# Patient Record
Sex: MALE | Race: WHITE | NOT HISPANIC OR LATINO | Employment: OTHER | URBAN - METROPOLITAN AREA
[De-identification: names, ages, dates, MRNs, and addresses within clinical notes are randomized per-mention and may not be internally consistent; named-entity substitution may affect disease eponyms.]

---

## 2023-02-14 PROBLEM — I35.1 AORTIC EJECTION MURMUR: Status: ACTIVE | Noted: 2023-02-14

## 2023-02-14 PROBLEM — I35.8 AORTIC VALVE SCLEROSIS: Status: ACTIVE | Noted: 2023-02-14

## 2023-02-14 PROBLEM — E66.9 CLASS 1 OBESITY WITH BODY MASS INDEX (BMI) OF 32.0 TO 32.9 IN ADULT: Status: ACTIVE | Noted: 2023-02-14

## 2023-02-14 PROBLEM — R09.89 FEMORAL BRUIT: Status: ACTIVE | Noted: 2023-02-14

## 2023-02-14 PROBLEM — K02.9 COMPLEX DENTAL CARIES: Status: ACTIVE | Noted: 2023-02-14

## 2023-02-14 PROBLEM — E66.811 CLASS 1 OBESITY WITH BODY MASS INDEX (BMI) OF 32.0 TO 32.9 IN ADULT: Status: ACTIVE | Noted: 2023-02-14

## 2023-02-14 PROBLEM — R00.1 SINUS BRADYCARDIA: Status: ACTIVE | Noted: 2023-02-14

## 2023-02-14 PROBLEM — N40.1 BPH ASSOCIATED WITH NOCTURIA: Status: ACTIVE | Noted: 2023-02-14

## 2023-02-14 PROBLEM — R09.89 ABDOMINAL BRUIT: Status: ACTIVE | Noted: 2023-02-14

## 2023-02-14 PROBLEM — R35.1 BPH ASSOCIATED WITH NOCTURIA: Status: ACTIVE | Noted: 2023-02-14

## 2023-02-14 PROBLEM — I10 HYPERTENSION: Status: ACTIVE | Noted: 2023-02-14

## 2023-02-14 PROBLEM — K43.9 VENTRAL HERNIA WITHOUT OBSTRUCTION OR GANGRENE: Status: ACTIVE | Noted: 2023-02-14

## 2023-02-14 PROBLEM — I10 ESSENTIAL HYPERTENSION: Status: ACTIVE | Noted: 2023-02-14

## 2023-02-14 PROBLEM — E78.5 HYPERLIPIDEMIA: Status: ACTIVE | Noted: 2023-02-14

## 2023-02-14 PROBLEM — E11.9 DIABETES MELLITUS (MULTI): Status: ACTIVE | Noted: 2023-02-14

## 2023-02-14 PROBLEM — C67.9: Status: ACTIVE | Noted: 2023-02-14

## 2023-02-14 PROBLEM — N18.30 CHRONIC KIDNEY DISEASE (CKD), STAGE III (MODERATE) (MULTI): Status: ACTIVE | Noted: 2023-02-14

## 2023-02-14 PROBLEM — I11.9 HYPERTENSIVE HEART DISEASE: Status: ACTIVE | Noted: 2023-02-14

## 2023-02-14 PROBLEM — E66.9 OBESITY: Status: ACTIVE | Noted: 2023-02-14

## 2023-02-14 RX ORDER — METFORMIN HYDROCHLORIDE 1000 MG/1
1 TABLET ORAL EVERY 12 HOURS
COMMUNITY
Start: 2021-05-26 | End: 2023-05-25

## 2023-02-14 RX ORDER — EZETIMIBE AND SIMVASTATIN 10; 40 MG/1; MG/1
1 TABLET ORAL EVERY EVENING
COMMUNITY
End: 2023-11-24 | Stop reason: SDUPTHER

## 2023-02-14 RX ORDER — AMLODIPINE BESYLATE 10 MG/1
1 TABLET ORAL DAILY
COMMUNITY
Start: 2020-08-14 | End: 2023-08-29 | Stop reason: SDUPTHER

## 2023-02-14 RX ORDER — SPIRONOLACTONE 25 MG/1
1 TABLET ORAL 2 TIMES DAILY
COMMUNITY
Start: 2022-04-11 | End: 2023-04-10

## 2023-02-14 RX ORDER — BLOOD SUGAR DIAGNOSTIC
STRIP MISCELLANEOUS
COMMUNITY
Start: 2019-12-02

## 2023-02-14 RX ORDER — LIRAGLUTIDE 6 MG/ML
1.2 INJECTION SUBCUTANEOUS DAILY
COMMUNITY
End: 2023-07-12

## 2023-02-14 RX ORDER — LOSARTAN POTASSIUM AND HYDROCHLOROTHIAZIDE 12.5; 1 MG/1; MG/1
1 TABLET ORAL DAILY
COMMUNITY
Start: 2020-12-21 | End: 2023-05-26

## 2023-02-14 RX ORDER — PIOGLITAZONEHYDROCHLORIDE 30 MG/1
1 TABLET ORAL DAILY
COMMUNITY
End: 2023-09-18

## 2023-02-14 RX ORDER — FERROUS SULFATE 325(65) MG
1 TABLET ORAL DAILY
COMMUNITY

## 2023-02-14 RX ORDER — PEN NEEDLE, DIABETIC 30 GX3/16"
NEEDLE, DISPOSABLE MISCELLANEOUS
COMMUNITY
End: 2023-07-17

## 2023-02-14 RX ORDER — CARVEDILOL 6.25 MG/1
1 TABLET ORAL
COMMUNITY
Start: 2014-07-31

## 2023-02-14 RX ORDER — CHOLECALCIFEROL (VITAMIN D3) 50 MCG
1 TABLET ORAL DAILY
COMMUNITY

## 2023-03-09 ENCOUNTER — TELEPHONE (OUTPATIENT)
Dept: PRIMARY CARE | Facility: CLINIC | Age: 78
End: 2023-03-09
Payer: COMMERCIAL

## 2023-03-09 DIAGNOSIS — N40.1 BPH ASSOCIATED WITH NOCTURIA: ICD-10-CM

## 2023-03-09 DIAGNOSIS — R35.1 BPH ASSOCIATED WITH NOCTURIA: ICD-10-CM

## 2023-03-09 DIAGNOSIS — I11.9 HYPERTENSIVE HEART DISEASE, UNSPECIFIED WHETHER HEART FAILURE PRESENT: Primary | ICD-10-CM

## 2023-03-22 ENCOUNTER — TELEPHONE (OUTPATIENT)
Dept: PRIMARY CARE | Facility: CLINIC | Age: 78
End: 2023-03-22
Payer: COMMERCIAL

## 2023-03-22 NOTE — TELEPHONE ENCOUNTER
----- Message from Itz Talbert DO sent at 3/21/2023  4:35 PM EDT -----  Please call patient back with lab results.  Overall they do look good, there is a slight increase in blood sugar, we will check an in office hemoglobin A1c at next appointment, otherwise no significant changes.  We will discuss more at next appointment.

## 2023-03-27 ENCOUNTER — OFFICE VISIT (OUTPATIENT)
Dept: PRIMARY CARE | Facility: CLINIC | Age: 78
End: 2023-03-27
Payer: COMMERCIAL

## 2023-03-27 VITALS
WEIGHT: 207.5 LBS | SYSTOLIC BLOOD PRESSURE: 137 MMHG | HEART RATE: 58 BPM | DIASTOLIC BLOOD PRESSURE: 60 MMHG | BODY MASS INDEX: 35.62 KG/M2 | OXYGEN SATURATION: 97 % | TEMPERATURE: 98.3 F | RESPIRATION RATE: 18 BRPM

## 2023-03-27 DIAGNOSIS — E11.9 TYPE 2 DIABETES MELLITUS WITHOUT COMPLICATION, WITHOUT LONG-TERM CURRENT USE OF INSULIN (MULTI): Primary | ICD-10-CM

## 2023-03-27 DIAGNOSIS — N40.1 BPH ASSOCIATED WITH NOCTURIA: ICD-10-CM

## 2023-03-27 DIAGNOSIS — N18.31 STAGE 3A CHRONIC KIDNEY DISEASE (MULTI): ICD-10-CM

## 2023-03-27 DIAGNOSIS — R73.9 HYPERGLYCEMIA: ICD-10-CM

## 2023-03-27 DIAGNOSIS — R35.1 BPH ASSOCIATED WITH NOCTURIA: ICD-10-CM

## 2023-03-27 LAB — POC HEMOGLOBIN A1C: 6.2 % (ref 4.2–6.5)

## 2023-03-27 PROCEDURE — 1159F MED LIST DOCD IN RCRD: CPT | Performed by: STUDENT IN AN ORGANIZED HEALTH CARE EDUCATION/TRAINING PROGRAM

## 2023-03-27 PROCEDURE — 99214 OFFICE O/P EST MOD 30 MIN: CPT | Performed by: STUDENT IN AN ORGANIZED HEALTH CARE EDUCATION/TRAINING PROGRAM

## 2023-03-27 PROCEDURE — 3078F DIAST BP <80 MM HG: CPT | Performed by: STUDENT IN AN ORGANIZED HEALTH CARE EDUCATION/TRAINING PROGRAM

## 2023-03-27 PROCEDURE — 3075F SYST BP GE 130 - 139MM HG: CPT | Performed by: STUDENT IN AN ORGANIZED HEALTH CARE EDUCATION/TRAINING PROGRAM

## 2023-03-27 PROCEDURE — 83036 HEMOGLOBIN GLYCOSYLATED A1C: CPT | Performed by: STUDENT IN AN ORGANIZED HEALTH CARE EDUCATION/TRAINING PROGRAM

## 2023-03-27 PROCEDURE — 1036F TOBACCO NON-USER: CPT | Performed by: STUDENT IN AN ORGANIZED HEALTH CARE EDUCATION/TRAINING PROGRAM

## 2023-03-27 PROCEDURE — 1160F RVW MEDS BY RX/DR IN RCRD: CPT | Performed by: STUDENT IN AN ORGANIZED HEALTH CARE EDUCATION/TRAINING PROGRAM

## 2023-03-27 ASSESSMENT — ENCOUNTER SYMPTOMS
LOSS OF SENSATION IN FEET: 0
DEPRESSION: 0
OCCASIONAL FEELINGS OF UNSTEADINESS: 0

## 2023-03-27 NOTE — PROGRESS NOTES
Subjective   Mario Alberto Segal is a 77 y.o. male who presents for Follow-up (Pt here today to for 6 month F/U and to discuss lab work).  Follows with urology   Repeat cystoscopy planned  Bladder lavage - has difficulties with catheter following post-procedural   Catheter 3-4 days after the procedure each time    BP checks at home 150s repeat today 137/50      Diabetic Review of Systems - medication compliance: compliant all of the time, diabetic diet compliance: noncompliant much of the time, home glucose monitoring: is performed regularly, acute symptoms are none .    none      Review of Systems   Constitutional:  Negative for fever.   Respiratory:  Negative for shortness of breath.    Cardiovascular:  Negative for chest pain.   Gastrointestinal:  Negative for nausea and vomiting.   Neurological:  Negative for dizziness and light-headedness.   All other systems reviewed and are negative.      Objective   Physical Exam  Vitals reviewed.   Constitutional:       General: He is not in acute distress.     Appearance: Normal appearance. He is not toxic-appearing.   HENT:      Head: Normocephalic and atraumatic.      Nose: Nose normal.   Eyes:      Extraocular Movements: Extraocular movements intact.   Cardiovascular:      Rate and Rhythm: Normal rate and regular rhythm.      Heart sounds: No murmur heard.     No friction rub. No gallop.   Pulmonary:      Effort: Pulmonary effort is normal. No respiratory distress.      Breath sounds: Normal breath sounds. No wheezing, rhonchi or rales.   Skin:     General: Skin is warm and dry.   Neurological:      General: No focal deficit present.      Mental Status: He is alert.   Psychiatric:         Mood and Affect: Mood normal.         Behavior: Behavior normal.         Assessment/Plan   Problem List Items Addressed This Visit          Genitourinary    Chronic kidney disease (CKD), stage III (moderate) (CMS/HCC)       Endocrine/Metabolic    Diabetes mellitus (CMS/HCC) - Primary     Relevant Orders    POCT glycosylated hemoglobin (Hb A1C) manually resulted (Completed)       Other    BPH associated with nocturia     Other Visit Diagnoses       Hyperglycemia        Relevant Orders    POCT glycosylated hemoglobin (Hb A1C) manually resulted (Completed)          Lab Results   Component Value Date    HGBA1C 6.2 03/27/2023   A1c 7.2 today.  Elevated for patient, but still in good control.  Continue treatment as previously prescribed  Complications include: Neuropathy  Continue to monitor blood sugars  Continue with follow ups including:   Ophthalmology: Up-to-date  Podiatry: Not Indicated  Cardiology: Not Indicated  Discussed healthy, low carb diet and trying to get 150 minutes of physical activity per week.  Follow up in 3 months or sooner if new concerns arise

## 2023-03-28 ASSESSMENT — ENCOUNTER SYMPTOMS
DIZZINESS: 0
SHORTNESS OF BREATH: 0
FEVER: 0
NAUSEA: 0
VOMITING: 0
LIGHT-HEADEDNESS: 0

## 2023-04-10 DIAGNOSIS — I10 ESSENTIAL (PRIMARY) HYPERTENSION: ICD-10-CM

## 2023-04-10 RX ORDER — SPIRONOLACTONE 25 MG/1
TABLET ORAL
Qty: 180 TABLET | Refills: 1 | Status: SHIPPED | OUTPATIENT
Start: 2023-04-10 | End: 2023-10-02 | Stop reason: ALTCHOICE

## 2023-05-25 DIAGNOSIS — E11.9 TYPE 2 DIABETES MELLITUS WITHOUT COMPLICATIONS (MULTI): ICD-10-CM

## 2023-05-25 RX ORDER — METFORMIN HYDROCHLORIDE 1000 MG/1
TABLET ORAL
Qty: 180 TABLET | Refills: 3 | Status: SHIPPED | OUTPATIENT
Start: 2023-05-25 | End: 2024-05-13

## 2023-05-26 DIAGNOSIS — I10 ESSENTIAL (PRIMARY) HYPERTENSION: ICD-10-CM

## 2023-05-26 RX ORDER — LOSARTAN POTASSIUM AND HYDROCHLOROTHIAZIDE 12.5; 1 MG/1; MG/1
TABLET ORAL
Qty: 90 TABLET | Refills: 2 | Status: SHIPPED | OUTPATIENT
Start: 2023-05-26 | End: 2024-03-07 | Stop reason: ALTCHOICE

## 2023-07-12 DIAGNOSIS — E11.9 TYPE 2 DIABETES MELLITUS WITHOUT COMPLICATIONS (MULTI): ICD-10-CM

## 2023-07-12 RX ORDER — LIRAGLUTIDE 6 MG/ML
INJECTION SUBCUTANEOUS
Qty: 18 EACH | Refills: 3 | Status: SHIPPED | OUTPATIENT
Start: 2023-07-12

## 2023-07-17 DIAGNOSIS — E11.9 TYPE 2 DIABETES MELLITUS WITHOUT COMPLICATIONS (MULTI): ICD-10-CM

## 2023-07-17 RX ORDER — PEN NEEDLE, DIABETIC 32GX 5/32"
NEEDLE, DISPOSABLE MISCELLANEOUS
Qty: 200 EACH | Refills: 3 | Status: SHIPPED | OUTPATIENT
Start: 2023-07-17

## 2023-07-27 ENCOUNTER — TELEPHONE (OUTPATIENT)
Dept: PRIMARY CARE | Facility: CLINIC | Age: 78
End: 2023-07-27
Payer: COMMERCIAL

## 2023-07-27 NOTE — TELEPHONE ENCOUNTER
Pt called to let you know he saw the specialist yesterday and his potassium was too high. They looked at his med list and are having him stop the Spironolactone. He wanted to let you know since he's taking it for BP and wanting to know if he should start something else now in place of that?

## 2023-08-09 ENCOUNTER — TELEPHONE (OUTPATIENT)
Dept: PRIMARY CARE | Facility: CLINIC | Age: 78
End: 2023-08-09
Payer: COMMERCIAL

## 2023-08-09 DIAGNOSIS — I10 ESSENTIAL HYPERTENSION: Primary | ICD-10-CM

## 2023-08-10 RX ORDER — HYDRALAZINE HYDROCHLORIDE 10 MG/1
10 TABLET, FILM COATED ORAL 2 TIMES DAILY
Qty: 60 TABLET | Refills: 1 | Status: SHIPPED | OUTPATIENT
Start: 2023-08-10 | End: 2023-09-06

## 2023-08-28 ENCOUNTER — TELEPHONE (OUTPATIENT)
Dept: PRIMARY CARE | Facility: CLINIC | Age: 78
End: 2023-08-28
Payer: COMMERCIAL

## 2023-08-28 DIAGNOSIS — I10 ESSENTIAL HYPERTENSION: ICD-10-CM

## 2023-08-29 DIAGNOSIS — I10 ESSENTIAL HYPERTENSION: Primary | ICD-10-CM

## 2023-08-29 RX ORDER — AMLODIPINE BESYLATE 10 MG/1
10 TABLET ORAL DAILY
Qty: 90 TABLET | Refills: 1 | Status: SHIPPED | OUTPATIENT
Start: 2023-08-29 | End: 2024-04-05

## 2023-09-02 DIAGNOSIS — I10 ESSENTIAL HYPERTENSION: ICD-10-CM

## 2023-09-06 RX ORDER — HYDRALAZINE HYDROCHLORIDE 10 MG/1
10 TABLET, FILM COATED ORAL 2 TIMES DAILY
Qty: 60 TABLET | Refills: 1 | Status: SHIPPED | OUTPATIENT
Start: 2023-09-06 | End: 2023-10-02 | Stop reason: SDUPTHER

## 2023-09-07 PROBLEM — D64.9 ANEMIA: Status: ACTIVE | Noted: 2023-09-07

## 2023-09-07 PROBLEM — E88.810 METABOLIC SYNDROME: Status: ACTIVE | Noted: 2023-09-07

## 2023-09-07 PROBLEM — E55.9 VITAMIN D DEFICIENCY: Status: ACTIVE | Noted: 2023-09-07

## 2023-09-07 RX ORDER — NYSTATIN 100000 [USP'U]/G
POWDER TOPICAL
COMMUNITY
Start: 2023-06-06

## 2023-09-07 RX ORDER — VERAPAMIL HYDROCHLORIDE 240 MG/1
0.5 TABLET, FILM COATED, EXTENDED RELEASE ORAL DAILY
COMMUNITY
End: 2024-04-15 | Stop reason: ALTCHOICE

## 2023-09-07 RX ORDER — CARVEDILOL 3.12 MG/1
3.12 TABLET ORAL 2 TIMES DAILY
COMMUNITY

## 2023-09-13 ENCOUNTER — TELEPHONE (OUTPATIENT)
Dept: PRIMARY CARE | Facility: CLINIC | Age: 78
End: 2023-09-13
Payer: COMMERCIAL

## 2023-09-13 DIAGNOSIS — Z12.5 PROSTATE CANCER SCREENING: Primary | ICD-10-CM

## 2023-09-13 DIAGNOSIS — E11.9 TYPE 2 DIABETES MELLITUS WITHOUT COMPLICATION, WITHOUT LONG-TERM CURRENT USE OF INSULIN (MULTI): ICD-10-CM

## 2023-09-13 DIAGNOSIS — Z11.59 ENCOUNTER FOR HEPATITIS C SCREENING TEST FOR LOW RISK PATIENT: ICD-10-CM

## 2023-09-13 DIAGNOSIS — Z11.4 ENCOUNTER FOR SCREENING FOR HIV: ICD-10-CM

## 2023-09-13 DIAGNOSIS — N18.31 STAGE 3A CHRONIC KIDNEY DISEASE (MULTI): ICD-10-CM

## 2023-09-18 DIAGNOSIS — E11.9 TYPE 2 DIABETES MELLITUS WITHOUT COMPLICATIONS (MULTI): ICD-10-CM

## 2023-09-18 RX ORDER — PIOGLITAZONEHYDROCHLORIDE 30 MG/1
30 TABLET ORAL DAILY
Qty: 90 TABLET | Refills: 2 | Status: SHIPPED | OUTPATIENT
Start: 2023-09-18

## 2023-10-02 ENCOUNTER — OFFICE VISIT (OUTPATIENT)
Dept: PRIMARY CARE | Facility: CLINIC | Age: 78
End: 2023-10-02
Payer: MEDICARE

## 2023-10-02 VITALS
DIASTOLIC BLOOD PRESSURE: 67 MMHG | HEIGHT: 64 IN | RESPIRATION RATE: 16 BRPM | OXYGEN SATURATION: 96 % | BODY MASS INDEX: 37.43 KG/M2 | TEMPERATURE: 99 F | SYSTOLIC BLOOD PRESSURE: 139 MMHG | HEART RATE: 68 BPM | WEIGHT: 219.25 LBS

## 2023-10-02 DIAGNOSIS — N18.31 STAGE 3A CHRONIC KIDNEY DISEASE (MULTI): ICD-10-CM

## 2023-10-02 DIAGNOSIS — E66.01 OBESITY, MORBID (MULTI): ICD-10-CM

## 2023-10-02 DIAGNOSIS — C67.9 CANCER OF BLADDER WALL (MULTI): ICD-10-CM

## 2023-10-02 DIAGNOSIS — E11.22 TYPE 2 DIABETES MELLITUS WITH STAGE 3A CHRONIC KIDNEY DISEASE, WITHOUT LONG-TERM CURRENT USE OF INSULIN (MULTI): ICD-10-CM

## 2023-10-02 DIAGNOSIS — N18.31 TYPE 2 DIABETES MELLITUS WITH STAGE 3A CHRONIC KIDNEY DISEASE, WITHOUT LONG-TERM CURRENT USE OF INSULIN (MULTI): ICD-10-CM

## 2023-10-02 DIAGNOSIS — Z00.00 ROUTINE GENERAL MEDICAL EXAMINATION AT HEALTH CARE FACILITY: Primary | ICD-10-CM

## 2023-10-02 DIAGNOSIS — I10 ESSENTIAL HYPERTENSION: ICD-10-CM

## 2023-10-02 PROCEDURE — 1170F FXNL STATUS ASSESSED: CPT | Performed by: STUDENT IN AN ORGANIZED HEALTH CARE EDUCATION/TRAINING PROGRAM

## 2023-10-02 PROCEDURE — 99214 OFFICE O/P EST MOD 30 MIN: CPT | Performed by: STUDENT IN AN ORGANIZED HEALTH CARE EDUCATION/TRAINING PROGRAM

## 2023-10-02 PROCEDURE — 1126F AMNT PAIN NOTED NONE PRSNT: CPT | Performed by: STUDENT IN AN ORGANIZED HEALTH CARE EDUCATION/TRAINING PROGRAM

## 2023-10-02 PROCEDURE — 1159F MED LIST DOCD IN RCRD: CPT | Performed by: STUDENT IN AN ORGANIZED HEALTH CARE EDUCATION/TRAINING PROGRAM

## 2023-10-02 PROCEDURE — 3078F DIAST BP <80 MM HG: CPT | Performed by: STUDENT IN AN ORGANIZED HEALTH CARE EDUCATION/TRAINING PROGRAM

## 2023-10-02 PROCEDURE — G0439 PPPS, SUBSEQ VISIT: HCPCS | Performed by: STUDENT IN AN ORGANIZED HEALTH CARE EDUCATION/TRAINING PROGRAM

## 2023-10-02 PROCEDURE — 3075F SYST BP GE 130 - 139MM HG: CPT | Performed by: STUDENT IN AN ORGANIZED HEALTH CARE EDUCATION/TRAINING PROGRAM

## 2023-10-02 PROCEDURE — 1160F RVW MEDS BY RX/DR IN RCRD: CPT | Performed by: STUDENT IN AN ORGANIZED HEALTH CARE EDUCATION/TRAINING PROGRAM

## 2023-10-02 PROCEDURE — 1036F TOBACCO NON-USER: CPT | Performed by: STUDENT IN AN ORGANIZED HEALTH CARE EDUCATION/TRAINING PROGRAM

## 2023-10-02 RX ORDER — HYDRALAZINE HYDROCHLORIDE 25 MG/1
25 TABLET, FILM COATED ORAL 2 TIMES DAILY
Qty: 90 TABLET | Refills: 0 | Status: SHIPPED | OUTPATIENT
Start: 2023-10-02 | End: 2023-11-10

## 2023-10-02 SDOH — ECONOMIC STABILITY: TRANSPORTATION INSECURITY
IN THE PAST 12 MONTHS, HAS THE LACK OF TRANSPORTATION KEPT YOU FROM MEDICAL APPOINTMENTS OR FROM GETTING MEDICATIONS?: NO

## 2023-10-02 SDOH — ECONOMIC STABILITY: FOOD INSECURITY: WITHIN THE PAST 12 MONTHS, YOU WORRIED THAT YOUR FOOD WOULD RUN OUT BEFORE YOU GOT MONEY TO BUY MORE.: NEVER TRUE

## 2023-10-02 SDOH — ECONOMIC STABILITY: FOOD INSECURITY: WITHIN THE PAST 12 MONTHS, THE FOOD YOU BOUGHT JUST DIDN'T LAST AND YOU DIDN'T HAVE MONEY TO GET MORE.: NEVER TRUE

## 2023-10-02 SDOH — ECONOMIC STABILITY: INCOME INSECURITY: IN THE LAST 12 MONTHS, WAS THERE A TIME WHEN YOU WERE NOT ABLE TO PAY THE MORTGAGE OR RENT ON TIME?: NO

## 2023-10-02 SDOH — ECONOMIC STABILITY: GENERAL
WHICH OF THE FOLLOWING WOULD YOU LIKE TO GET CONNECTED TO IN ORDER TO RECEIVE A DISCOUNT OR FOR FREE? (CHOOSE ALL THAT APPLY): NONE OF THESE

## 2023-10-02 SDOH — ECONOMIC STABILITY: HOUSING INSECURITY
IN THE LAST 12 MONTHS, WAS THERE A TIME WHEN YOU DID NOT HAVE A STEADY PLACE TO SLEEP OR SLEPT IN A SHELTER (INCLUDING NOW)?: NO

## 2023-10-02 SDOH — ECONOMIC STABILITY: GENERAL
WHICH OF THE FOLLOWING DO YOU KNOW HOW TO USE AND HAVE ACCESS TO EVERY DAY? (CHOOSE ALL THAT APPLY): SMARTPHONE WITH CELLULAR DATA PLAN;NONE OF THESE

## 2023-10-02 SDOH — ECONOMIC STABILITY: TRANSPORTATION INSECURITY
IN THE PAST 12 MONTHS, HAS LACK OF TRANSPORTATION KEPT YOU FROM MEETINGS, WORK, OR FROM GETTING THINGS NEEDED FOR DAILY LIVING?: NO

## 2023-10-02 SDOH — HEALTH STABILITY: PHYSICAL HEALTH: ON AVERAGE, HOW MANY MINUTES DO YOU ENGAGE IN EXERCISE AT THIS LEVEL?: 30 MIN

## 2023-10-02 SDOH — HEALTH STABILITY: PHYSICAL HEALTH: ON AVERAGE, HOW MANY DAYS PER WEEK DO YOU ENGAGE IN MODERATE TO STRENUOUS EXERCISE (LIKE A BRISK WALK)?: 7 DAYS

## 2023-10-02 ASSESSMENT — SOCIAL DETERMINANTS OF HEALTH (SDOH)
HOW HARD IS IT FOR YOU TO PAY FOR THE VERY BASICS LIKE FOOD, HOUSING, MEDICAL CARE, AND HEATING?: NOT HARD AT ALL
HOW OFTEN DO YOU GET TOGETHER WITH FRIENDS OR RELATIVES?: NEVER
HOW OFTEN DO YOU ATTEND CHURCH OR RELIGIOUS SERVICES?: NEVER
WITHIN THE LAST YEAR, HAVE YOU BEEN KICKED, HIT, SLAPPED, OR OTHERWISE PHYSICALLY HURT BY YOUR PARTNER OR EX-PARTNER?: NO
IN THE PAST 12 MONTHS, HAS THE ELECTRIC, GAS, OIL, OR WATER COMPANY THREATENED TO SHUT OFF SERVICE IN YOUR HOME?: NO
WITHIN THE LAST YEAR, HAVE YOU BEEN AFRAID OF YOUR PARTNER OR EX-PARTNER?: NO
DO YOU BELONG TO ANY CLUBS OR ORGANIZATIONS SUCH AS CHURCH GROUPS UNIONS, FRATERNAL OR ATHLETIC GROUPS, OR SCHOOL GROUPS?: NO
WITHIN THE LAST YEAR, HAVE YOU BEEN HUMILIATED OR EMOTIONALLY ABUSED IN OTHER WAYS BY YOUR PARTNER OR EX-PARTNER?: NO
IN A TYPICAL WEEK, HOW MANY TIMES DO YOU TALK ON THE PHONE WITH FAMILY, FRIENDS, OR NEIGHBORS?: TWICE A WEEK
HOW OFTEN DO YOU ATTENT MEETINGS OF THE CLUB OR ORGANIZATION YOU BELONG TO?: NEVER
WITHIN THE LAST YEAR, HAVE TO BEEN RAPED OR FORCED TO HAVE ANY KIND OF SEXUAL ACTIVITY BY YOUR PARTNER OR EX-PARTNER?: NO

## 2023-10-02 ASSESSMENT — ENCOUNTER SYMPTOMS
APPETITE CHANGE: 0
NAUSEA: 0
LIGHT-HEADEDNESS: 0
FATIGUE: 0
FLANK PAIN: 0
SHORTNESS OF BREATH: 0
ABDOMINAL PAIN: 0
LOSS OF SENSATION IN FEET: 0
DEPRESSION: 0
HEMATURIA: 0
BLOOD IN STOOL: 0
ARTHRALGIAS: 0
CONSTIPATION: 0
MYALGIAS: 0
OCCASIONAL FEELINGS OF UNSTEADINESS: 0
PALPITATIONS: 0
FEVER: 0
SINUS PAIN: 0
VOMITING: 0
RHINORRHEA: 0
DIARRHEA: 0
DIZZINESS: 0

## 2023-10-02 ASSESSMENT — LIFESTYLE VARIABLES
HOW MANY STANDARD DRINKS CONTAINING ALCOHOL DO YOU HAVE ON A TYPICAL DAY: PATIENT DOES NOT DRINK
HOW OFTEN DO YOU HAVE A DRINK CONTAINING ALCOHOL: NEVER

## 2023-10-02 ASSESSMENT — ACTIVITIES OF DAILY LIVING (ADL)
GROCERY_SHOPPING: INDEPENDENT
DOING_HOUSEWORK: INDEPENDENT
BATHING: INDEPENDENT
TAKING_MEDICATION: INDEPENDENT
DRESSING: INDEPENDENT
MANAGING_FINANCES: INDEPENDENT

## 2023-10-02 ASSESSMENT — PATIENT HEALTH QUESTIONNAIRE - PHQ9
SUM OF ALL RESPONSES TO PHQ9 QUESTIONS 1 AND 2: 0
2. FEELING DOWN, DEPRESSED OR HOPELESS: NOT AT ALL
1. LITTLE INTEREST OR PLEASURE IN DOING THINGS: NOT AT ALL

## 2023-10-02 NOTE — PROGRESS NOTES
Subjective   Mario Alberto Segal is a 77 y.o. male who is here for a routine exam.  Active Problem List      Comprehensive Medical/Surgical/Social/Family History  Past Medical History:   Diagnosis Date    Impacted cerumen, right ear 09/05/2019    Impacted cerumen of right ear    Other specified disorders of kidney and ureter 02/11/2022    Renal mass    Personal history of nicotine dependence 09/20/2021    History of nicotine dependence    Personal history of other diseases of urinary system 02/11/2022    History of hematuria    Personal history of urinary (tract) infections 12/20/2021    History of hemorrhagic cystitis    Unspecified symptoms and signs involving the genitourinary system     UTI symptoms     Past Surgical History:   Procedure Laterality Date    OTHER SURGICAL HISTORY  04/07/2022    Transurethral resection of bladder tumor     Social History     Social History Narrative    Not on file       Allergies and Medications  Patient has no known allergies.  Current Outpatient Medications on File Prior to Visit   Medication Sig Dispense Refill    amLODIPine (Norvasc) 10 mg tablet Take 1 tablet (10 mg) by mouth once daily. 90 tablet 1    ASPIRIN ORAL Take 1 tablet by mouth in the morning. Aspirin 81 MG TABS      carvedilol (Coreg) 6.25 mg tablet Take 1 tablet (6.25 mg) by mouth 2 times a day with meals.      cholecalciferol (Vitamin D-3) 50 MCG (2000 UT) tablet Take 1 tablet (50 mcg) by mouth once daily.      ezetimibe-simvastatin (Vytorin) 10-40 mg tablet Take 1 tablet by mouth once daily in the evening.      ferrous sulfate 325 (65 Fe) MG tablet Take 1 tablet (325 mg) by mouth once daily.      folic acid/multivit-min/lutein (CENTRUM SILVER ORAL)       liraglutide (Victoza 3-Dirk) 0.6 mg/0.1 mL (18 mg/3 mL) injection INJECT 1.2 MG UNDER THE SKIN ONCE DAILY 18 each 3    losartan-hydrochlorothiazide (Hyzaar) 100-12.5 mg tablet TAKE 1 TABLET BY MOUTH EVERY DAY 90 tablet 2    metFORMIN (Glucophage) 1,000 mg tablet  "TAKE 1 TABLET BY MOUTH EVERY 12 HOURS 180 tablet 3    nystatin (Mycostatin) 100,000 unit/gram powder Apply topically.  APPLY 2-3 TIMES DAILY TO AFFECTED AREA(S).      OneTouch Ultra Test strip 1 strip daily      pen needle, diabetic (BD Geraldine 2nd Gen Pen Needle) 32 gauge x 5/32\" needle USE 1 DAILY 200 each 3    pioglitazone (Actos) 30 mg tablet TAKE 1 TABLET BY MOUTH EVERY DAY 90 tablet 2    [DISCONTINUED] hydrALAZINE (Apresoline) 10 mg tablet TAKE 1 TABLET BY MOUTH TWICE A DAY 60 tablet 1    carvedilol (Coreg) 3.125 mg tablet Take 1 tablet (3.125 mg) by mouth 2 times a day.      verapamil SR (Calan-SR) 240 mg ER tablet Take 0.5 tablets (120 mg) by mouth once daily.      [DISCONTINUED] spironolactone (Aldactone) 25 mg tablet TAKE 1 TABLET BY MOUTH TWICE A DAY (Patient not taking: Reported on 10/2/2023) 180 tablet 1     No current facility-administered medications on file prior to visit.       Concerns today:  BP patient started hydralazine 10 mg, had an average blood pressure also in the cardiologist, he states that it has been high regularly at home regularly 1 55-1 60 systolic, always low normal diastolic.    No additional follow-up necessary for the murmur per cardiology.    Patient needs repeat evaluation of diabetes, does state he has symptoms of neuropathy, known chronic kidney disease does follow with nephrology.  Blood sugars have been well controlled recently, is currently on several medications including Victoza, metformin, pioglitazone.    No additional concerns today    Lifestyle  Diet: Healthy, Well Balanced, and Diabetic, carb-controlled  Physical Activity: Sufficiently Active (10/2/2023)    Exercise Vital Sign     Days of Exercise per Week: 7 days     Minutes of Exercise per Session: 30 min      Tobacco Use: Medium Risk (10/2/2023)    Patient History     Smoking Tobacco Use: Former     Smokeless Tobacco Use: Never     Passive Exposure: Not on file      Alcohol Use: Not At Risk (10/2/2023)    AUDIT-C    " " Frequency of Alcohol Consumption: Never     Average Number of Drinks: Patient does not drink     Frequency of Binge Drinking: Not on file      Depression: Not at risk (10/2/2023)    PHQ-2     PHQ-2 Score: 0      Stress: No Stress Concern Present (10/2/2023)    [employer] - Occupational Stress Questionnaire     Feeling of Stress : Not at all       Consultants:  Follows with cardio -Lalo  Nephro - No significant findings      Urology - Vu  Cystoscopy in 1 week        Colorectal Screening: Not indicated due to age    Nocturia: .Present  Erectile dysfunction: .Did not discuss    Review of Systems   Constitutional:  Negative for appetite change, fatigue and fever.   HENT:  Negative for ear discharge, ear pain, hearing loss, postnasal drip, rhinorrhea and sinus pain.    Eyes:  Negative for visual disturbance.   Respiratory:  Negative for shortness of breath.    Cardiovascular:  Negative for chest pain, palpitations and leg swelling.   Gastrointestinal:  Negative for abdominal pain, blood in stool, constipation, diarrhea, nausea and vomiting.   Genitourinary:  Negative for flank pain and hematuria.   Musculoskeletal:  Negative for arthralgias and myalgias.   Skin:  Negative for rash.   Neurological:  Negative for dizziness and light-headedness.   All other systems reviewed and are negative.      Objective   /67 (BP Location: Right arm, Patient Position: Sitting)   Pulse 68   Temp 37.2 °C (99 °F) (Temporal)   Resp 16   Ht 1.626 m (5' 4\")   Wt 99.5 kg (219 lb 4 oz)   SpO2 96%   BMI 37.63 kg/m²     Physical Exam  Vitals reviewed.   Constitutional:       General: He is not in acute distress.     Appearance: Normal appearance. He is obese. He is not toxic-appearing.   HENT:      Head: Normocephalic and atraumatic.      Right Ear: Tympanic membrane and ear canal normal.      Left Ear: Tympanic membrane and ear canal normal.      Nose: Nose normal. No congestion or " rhinorrhea.      Mouth/Throat:      Mouth: Mucous membranes are dry.   Eyes:      General: No scleral icterus.     Extraocular Movements: Extraocular movements intact.      Conjunctiva/sclera: Conjunctivae normal.      Pupils: Pupils are equal, round, and reactive to light.   Cardiovascular:      Rate and Rhythm: Normal rate and regular rhythm.      Heart sounds: No murmur heard.     No friction rub. No gallop.   Pulmonary:      Effort: Pulmonary effort is normal. No respiratory distress.      Breath sounds: Normal breath sounds. No wheezing, rhonchi or rales.   Abdominal:      General: Abdomen is flat. There is no distension.      Palpations: Abdomen is soft.      Tenderness: There is no abdominal tenderness. There is no guarding.   Musculoskeletal:         General: Normal range of motion.      Cervical back: Normal range of motion and neck supple.      Right lower leg: No edema.      Left lower leg: No edema.   Lymphadenopathy:      Cervical: No cervical adenopathy.   Skin:     General: Skin is warm and dry.   Neurological:      General: No focal deficit present.      Mental Status: He is alert and oriented to person, place, and time.   Psychiatric:         Mood and Affect: Mood normal.         Behavior: Behavior normal.         Assessment/Plan   Problem List Items Addressed This Visit       Cancer of bladder wall (CMS/HCC)    Chronic kidney disease (CKD), stage III (moderate) (CMS/HCC)    Relevant Orders    Comprehensive metabolic panel    Type 2 diabetes mellitus with stage 3a chronic kidney disease, without long-term current use of insulin (CMS/HCC)    Essential hypertension    Relevant Medications    hydrALAZINE (Apresoline) 25 mg tablet     Other Visit Diagnoses       Routine general medical examination at health care facility    -  Primary    Obesity, morbid (CMS/HCC)              Reviewed Social Determinants of health with patient, discussed healthy lifestyle including 150 minutes of physical activity per  week  Ordered/Reviewed baseline labwork -CBC, CMP, Lipid Panel  Immunizations: Up To Date  Colonoscopy Not indicated due to age    Continue follow-ups including nephrology, urology, cardiology.    Continue treatment for bladder cancer with urology.    We will increase hydralazine from 10 to 25 mg today    Today in immunizations    Reviewed lab work ordered before the visit.    Follow-up as new concerns arise.  Or in 6 months for repeat diabetic check

## 2023-10-12 ENCOUNTER — PROCEDURE VISIT (OUTPATIENT)
Dept: UROLOGY | Facility: CLINIC | Age: 78
End: 2023-10-12
Payer: MEDICARE

## 2023-10-12 VITALS
RESPIRATION RATE: 18 BRPM | WEIGHT: 220.35 LBS | SYSTOLIC BLOOD PRESSURE: 184 MMHG | HEART RATE: 66 BPM | BODY MASS INDEX: 36.71 KG/M2 | HEIGHT: 65 IN | DIASTOLIC BLOOD PRESSURE: 66 MMHG

## 2023-10-12 DIAGNOSIS — R35.1 BPH ASSOCIATED WITH NOCTURIA: ICD-10-CM

## 2023-10-12 DIAGNOSIS — C67.9 CANCER OF BLADDER WALL (MULTI): Primary | ICD-10-CM

## 2023-10-12 DIAGNOSIS — N40.1 BPH ASSOCIATED WITH NOCTURIA: ICD-10-CM

## 2023-10-12 DIAGNOSIS — N21.0 BLADDER CALCULI: ICD-10-CM

## 2023-10-12 DIAGNOSIS — C67.9 MALIGNANT NEOPLASM OF URINARY BLADDER, UNSPECIFIED SITE (MULTI): ICD-10-CM

## 2023-10-12 PROCEDURE — 88112 CYTOPATH CELL ENHANCE TECH: CPT | Performed by: PATHOLOGY

## 2023-10-12 PROCEDURE — 88112 CYTOPATH CELL ENHANCE TECH: CPT | Mod: TC,MCY | Performed by: UROLOGY

## 2023-10-12 PROCEDURE — 52000 CYSTOURETHROSCOPY: CPT | Performed by: UROLOGY

## 2023-10-12 PROCEDURE — 88112 CYTOPATH CELL ENHANCE TECH: CPT | Mod: TC,CMCLAB | Performed by: UROLOGY

## 2023-10-12 PROCEDURE — 99214 OFFICE O/P EST MOD 30 MIN: CPT | Performed by: UROLOGY

## 2023-10-12 ASSESSMENT — PAIN SCALES - GENERAL: PAINLEVEL: 0-NO PAIN

## 2023-10-12 NOTE — PROGRESS NOTES
PRIOR NOTES  77-year-old male referred to me for gross hematuria  PMH: BPH, CKD 3, DM, HTN, HLP, obesity BMI 30  No blood thinners  Gross hematuria beginning 12/21, w/ clots  Current smoker - smokes cigars 15-20y 5-8 per day, annika sweet perfectos  No fhx of cancer  Sig urg/freq  Urine culture negative, 12/20/2021  UA 12/20: 3+ hemoglobin  A1c 5.8  CTU: prostate vol 92.9cc, right sided 1.7 cm cystic lesion with high density within it, not clearly a complex cyst versus a small solid mass. Very small cyst throughout both kidneys  Cystoscopy with small bladder tumors, large obstructing prostate with intravesical component  OR 3/15 - small 1.5cm tumors R side both 1.5cm, woodard left  Pathology: TaHG - muscle present and uninvolved.  Severe LUTS  OR 8/16/2022 uncomplicated cystolitholapaxy, biopsy of erythematous areas on the right side    Initial AUA risk - High risk (multifocal, TaHG)    Bladder cancer mgmt:  3/2022 - initial TURBT TaHG muscle present and neg  04/2022 - 06/2022 - induction BCG full strength 6/6 07/11/2022 -erythema right lateral wall, bladder stone  08/16/22 - bladder biopsy + fulguration -benign  09/2022 - 10/2022 - Maintenance BCG 3/3 90  11/14/22 -surveillance cystoscopy with persistent erythema  11/14/22 - cytology +  OR 1/3/23 - bladder biopsies + selective cytology - all benign  4/6/2023-surveillance cystoscopy with no evidence of disease  06/2023 - BCG 3/3 maintenance completed    UPDATED SUBJECTIVE HISTORY  10/12/23 - Pt has medication issues right now.  No gross hematuria, fatigue, dysuria.  Feels he is emptying well.    Past Medical History  He has a past medical history of Impacted cerumen, right ear (09/05/2019), Other specified disorders of kidney and ureter (02/11/2022), Personal history of nicotine dependence (09/20/2021), Personal history of other diseases of urinary system (02/11/2022), Personal history of urinary (tract) infections (12/20/2021), and Unspecified symptoms and signs  involving the genitourinary system.    Surgical History  He has a past surgical history that includes Other surgical history (04/07/2022).     Social History  He reports that he quit smoking about 19 months ago. His smoking use included cigars. He has never used smokeless tobacco. No history on file for alcohol use and drug use.    Family History  Family History   Problem Relation Name Age of Onset    Diabetes Mother      Hypertension Maternal Grandfather          Allergies  Patient has no known allergies.    ROS: 12 system review was completed and is negative with the exception of those signs and symptoms noted in the history of present illness: A 12 system review was completed and is negative with the exception of those signs and symptoms noted in the history of present illness.     Exam:  General: in NAD, appears stated age  Head: normocephalic, atraumatic  Respiratory: normal effort, no use of accessory muscles  Cardiovascular: no edema noted  Skin: normal turgor, no rashes  Neurologic: grossly intact, oriented to person/place/time  Psychiatric: mode and affect appropriate     Last Recorded Vitals  There were no vitals taken for this visit.    Lab Results   Component Value Date    CREATININE 0.97 08/09/2022    HGB 12.1 (L) 08/09/2022       Patient ID: Mario Alberto Segal is a 77 y.o. male.    Cystoscopy    Date/Time: 10/12/2023 10:14 AM    Performed by: Sacha Delgado MD  Authorized by: Sacha Delgado MD      Comments:      The R/B/A to the following procedure were discussed and an informed consent was signed. A time-out was performed confirming the correct procedure, laterality (if applicable), and plan.    The patient was prepped and draped in the standard sterile fashion. A 16 Polish flexible cystoscope was inserted through the penis. The following finding were noted:    Anterior urethra -normal  Prostate -markedly enlarged lateral and median lobes  Bladder mucosa-no tumors present.  He does have what  appears to be a soft bladder stone  Ureteral orifices -visualized in orthotopic position    The patient tolerated the procedure well.        ASSESSMENT/PLAN:  #High risk bladder cancer  -CT urogram  -Continue maintenance BCG x3 years, next dose mid December x3  -Cystoscopy with me in 6 months  -Urine cytology    #Bladder stone  -Observation, he is asymptomatic, fiver need to go back for TURBT or biopsies and I will take care of it at that time    Sacha Delgado MD

## 2023-10-16 ENCOUNTER — TELEPHONE (OUTPATIENT)
Dept: PRIMARY CARE | Facility: CLINIC | Age: 78
End: 2023-10-16
Payer: MEDICARE

## 2023-10-16 LAB
LABORATORY COMMENT REPORT: NORMAL
LABORATORY COMMENT REPORT: NORMAL
PATH REPORT.FINAL DX SPEC: NORMAL
PATH REPORT.GROSS SPEC: NORMAL
PATH REPORT.RELEVANT HX SPEC: NORMAL
PATH REPORT.TOTAL CANCER: NORMAL

## 2023-10-16 NOTE — TELEPHONE ENCOUNTER
Pt said he was able to get a 3 month supply of victoza from [facility] and doesn't need his meds changed for now.

## 2023-10-19 ENCOUNTER — LAB (OUTPATIENT)
Dept: LAB | Facility: LAB | Age: 78
End: 2023-10-19
Payer: MEDICARE

## 2023-10-19 DIAGNOSIS — C67.9 CANCER OF BLADDER WALL (MULTI): ICD-10-CM

## 2023-10-19 LAB
CREAT SERPL-MCNC: 1.18 MG/DL (ref 0.5–1.3)
GFR SERPL CREATININE-BSD FRML MDRD: 64 ML/MIN/1.73M*2

## 2023-10-19 PROCEDURE — 36415 COLL VENOUS BLD VENIPUNCTURE: CPT

## 2023-10-19 PROCEDURE — 82565 ASSAY OF CREATININE: CPT

## 2023-10-27 ENCOUNTER — HOSPITAL ENCOUNTER (OUTPATIENT)
Dept: RADIOLOGY | Facility: HOSPITAL | Age: 78
Discharge: HOME | End: 2023-10-27
Payer: MEDICARE

## 2023-10-27 DIAGNOSIS — C67.9 MALIGNANT NEOPLASM OF URINARY BLADDER, UNSPECIFIED SITE (MULTI): ICD-10-CM

## 2023-10-27 PROCEDURE — 76377 3D RENDER W/INTRP POSTPROCES: CPT | Performed by: STUDENT IN AN ORGANIZED HEALTH CARE EDUCATION/TRAINING PROGRAM

## 2023-10-27 PROCEDURE — 2550000001 HC RX 255 CONTRASTS

## 2023-10-27 PROCEDURE — 74178 CT ABD&PLV WO CNTR FLWD CNTR: CPT

## 2023-10-27 PROCEDURE — 74178 CT ABD&PLV WO CNTR FLWD CNTR: CPT | Performed by: STUDENT IN AN ORGANIZED HEALTH CARE EDUCATION/TRAINING PROGRAM

## 2023-10-27 RX ADMIN — IOHEXOL 100 ML: 350 INJECTION, SOLUTION INTRAVENOUS at 09:59

## 2023-11-09 DIAGNOSIS — I10 ESSENTIAL HYPERTENSION: ICD-10-CM

## 2023-11-10 RX ORDER — HYDRALAZINE HYDROCHLORIDE 25 MG/1
25 TABLET, FILM COATED ORAL 2 TIMES DAILY
Qty: 180 TABLET | Refills: 1 | Status: SHIPPED | OUTPATIENT
Start: 2023-11-10 | End: 2024-03-07 | Stop reason: SDUPTHER

## 2023-11-24 DIAGNOSIS — I11.9 HYPERTENSIVE HEART DISEASE, UNSPECIFIED WHETHER HEART FAILURE PRESENT: Primary | ICD-10-CM

## 2023-11-25 RX ORDER — EZETIMIBE AND SIMVASTATIN 10; 40 MG/1; MG/1
1 TABLET ORAL EVERY EVENING
Qty: 90 TABLET | Refills: 1 | Status: SHIPPED | OUTPATIENT
Start: 2023-11-25 | End: 2024-06-04 | Stop reason: SDUPTHER

## 2023-12-15 ENCOUNTER — OFFICE VISIT (OUTPATIENT)
Dept: UROLOGY | Facility: CLINIC | Age: 78
End: 2023-12-15
Payer: MEDICARE

## 2023-12-15 VITALS — SYSTOLIC BLOOD PRESSURE: 162 MMHG | TEMPERATURE: 99 F | DIASTOLIC BLOOD PRESSURE: 68 MMHG | HEART RATE: 70 BPM

## 2023-12-15 DIAGNOSIS — C67.9 MALIGNANT NEOPLASM OF URINARY BLADDER, UNSPECIFIED SITE (MULTI): ICD-10-CM

## 2023-12-15 LAB
POC APPEARANCE, URINE: CLEAR
POC BILIRUBIN, URINE: NEGATIVE
POC BLOOD, URINE: ABNORMAL
POC COLOR, URINE: YELLOW
POC GLUCOSE, URINE: NEGATIVE MG/DL
POC KETONES, URINE: NEGATIVE MG/DL
POC LEUKOCYTES, URINE: NEGATIVE
POC NITRITE,URINE: NEGATIVE
POC PH, URINE: 7 PH
POC PROTEIN, URINE: ABNORMAL MG/DL
POC SPECIFIC GRAVITY, URINE: 1.02
POC UROBILINOGEN, URINE: 0.2 EU/DL

## 2023-12-15 PROCEDURE — 51720 TREATMENT OF BLADDER LESION: CPT | Performed by: UROLOGY

## 2023-12-15 PROCEDURE — 3077F SYST BP >= 140 MM HG: CPT | Performed by: UROLOGY

## 2023-12-15 PROCEDURE — 3078F DIAST BP <80 MM HG: CPT | Performed by: UROLOGY

## 2023-12-15 PROCEDURE — 1036F TOBACCO NON-USER: CPT | Performed by: UROLOGY

## 2023-12-15 PROCEDURE — 1160F RVW MEDS BY RX/DR IN RCRD: CPT | Performed by: UROLOGY

## 2023-12-15 PROCEDURE — 1159F MED LIST DOCD IN RCRD: CPT | Performed by: UROLOGY

## 2023-12-15 PROCEDURE — 81003 URINALYSIS AUTO W/O SCOPE: CPT | Performed by: UROLOGY

## 2023-12-15 PROCEDURE — 1126F AMNT PAIN NOTED NONE PRSNT: CPT | Performed by: UROLOGY

## 2023-12-15 NOTE — PROGRESS NOTES
BCG 25 mg 1/3 instilled via 14 Fr. Coude catheter.  Reviewed post instillation instructions and provided written instructions for reference.  RTC in 1 week.

## 2023-12-22 ENCOUNTER — OFFICE VISIT (OUTPATIENT)
Dept: UROLOGY | Facility: CLINIC | Age: 78
End: 2023-12-22
Payer: MEDICARE

## 2023-12-22 VITALS — TEMPERATURE: 97.1 F | SYSTOLIC BLOOD PRESSURE: 158 MMHG | HEART RATE: 71 BPM | DIASTOLIC BLOOD PRESSURE: 68 MMHG

## 2023-12-22 DIAGNOSIS — C67.9 CANCER OF BLADDER WALL (MULTI): ICD-10-CM

## 2023-12-22 LAB
POC APPEARANCE, URINE: CLEAR
POC BLOOD, URINE: ABNORMAL
POC COLOR, URINE: YELLOW
POC GLUCOSE, URINE: NEGATIVE MG/DL
POC LEUKOCYTES, URINE: NEGATIVE
POC NITRITE,URINE: NEGATIVE
POC PH, URINE: 7 PH
POC PROTEIN, URINE: ABNORMAL MG/DL

## 2023-12-22 PROCEDURE — 1160F RVW MEDS BY RX/DR IN RCRD: CPT | Performed by: UROLOGY

## 2023-12-22 PROCEDURE — 51720 TREATMENT OF BLADDER LESION: CPT | Performed by: UROLOGY

## 2023-12-22 PROCEDURE — 1126F AMNT PAIN NOTED NONE PRSNT: CPT | Performed by: UROLOGY

## 2023-12-22 PROCEDURE — 81003 URINALYSIS AUTO W/O SCOPE: CPT | Performed by: UROLOGY

## 2023-12-22 PROCEDURE — 3077F SYST BP >= 140 MM HG: CPT | Performed by: UROLOGY

## 2023-12-22 PROCEDURE — 3078F DIAST BP <80 MM HG: CPT | Performed by: UROLOGY

## 2023-12-22 PROCEDURE — 1159F MED LIST DOCD IN RCRD: CPT | Performed by: UROLOGY

## 2023-12-22 PROCEDURE — 1036F TOBACCO NON-USER: CPT | Performed by: UROLOGY

## 2023-12-29 ENCOUNTER — OFFICE VISIT (OUTPATIENT)
Dept: UROLOGY | Facility: CLINIC | Age: 78
End: 2023-12-29
Payer: MEDICARE

## 2023-12-29 VITALS — TEMPERATURE: 96.3 F | DIASTOLIC BLOOD PRESSURE: 72 MMHG | HEART RATE: 72 BPM | SYSTOLIC BLOOD PRESSURE: 173 MMHG

## 2023-12-29 DIAGNOSIS — C67.9 MALIGNANT NEOPLASM OF URINARY BLADDER, UNSPECIFIED SITE (MULTI): Primary | ICD-10-CM

## 2023-12-29 LAB
POC APPEARANCE, URINE: CLEAR
POC BLOOD, URINE: ABNORMAL
POC COLOR, URINE: YELLOW
POC GLUCOSE, URINE: NEGATIVE MG/DL
POC LEUKOCYTES, URINE: NEGATIVE
POC NITRITE,URINE: NEGATIVE
POC PH, URINE: 5.5 PH
POC PROTEIN, URINE: ABNORMAL MG/DL

## 2023-12-29 PROCEDURE — 3078F DIAST BP <80 MM HG: CPT | Performed by: UROLOGY

## 2023-12-29 PROCEDURE — 51720 TREATMENT OF BLADDER LESION: CPT | Performed by: UROLOGY

## 2023-12-29 PROCEDURE — 1159F MED LIST DOCD IN RCRD: CPT | Performed by: UROLOGY

## 2023-12-29 PROCEDURE — 1160F RVW MEDS BY RX/DR IN RCRD: CPT | Performed by: UROLOGY

## 2023-12-29 PROCEDURE — 3077F SYST BP >= 140 MM HG: CPT | Performed by: UROLOGY

## 2023-12-29 PROCEDURE — 1036F TOBACCO NON-USER: CPT | Performed by: UROLOGY

## 2023-12-29 PROCEDURE — 81003 URINALYSIS AUTO W/O SCOPE: CPT | Performed by: UROLOGY

## 2023-12-29 PROCEDURE — 1126F AMNT PAIN NOTED NONE PRSNT: CPT | Performed by: UROLOGY

## 2023-12-29 NOTE — PROGRESS NOTES
BCG 3/3 instilled via 14 fr. Umesh mendoza without difficulty.  Able to hold for about 90 minutes.  Had fever of 100 last treatment.  Will treat with Tylenol today when he arrives home.  RTC in 4- 6 weeks for cystoscopy.

## 2024-02-08 ENCOUNTER — PROCEDURE VISIT (OUTPATIENT)
Dept: UROLOGY | Facility: CLINIC | Age: 79
End: 2024-02-08
Payer: MEDICARE

## 2024-02-08 VITALS
HEIGHT: 64 IN | BODY MASS INDEX: 38.72 KG/M2 | WEIGHT: 226.8 LBS | SYSTOLIC BLOOD PRESSURE: 172 MMHG | HEART RATE: 77 BPM | DIASTOLIC BLOOD PRESSURE: 73 MMHG | RESPIRATION RATE: 18 BRPM

## 2024-02-08 DIAGNOSIS — R39.9 UTI SYMPTOMS: ICD-10-CM

## 2024-02-08 PROCEDURE — 99213 OFFICE O/P EST LOW 20 MIN: CPT | Mod: 25,27 | Performed by: UROLOGY

## 2024-02-08 PROCEDURE — 99213 OFFICE O/P EST LOW 20 MIN: CPT | Performed by: UROLOGY

## 2024-02-08 PROCEDURE — 87086 URINE CULTURE/COLONY COUNT: CPT | Performed by: UROLOGY

## 2024-02-08 RX ORDER — SULFAMETHOXAZOLE AND TRIMETHOPRIM 800; 160 MG/1; MG/1
1 TABLET ORAL 2 TIMES DAILY
Qty: 14 TABLET | Refills: 0 | Status: SHIPPED | OUTPATIENT
Start: 2024-02-08 | End: 2024-02-15

## 2024-02-08 ASSESSMENT — PAIN SCALES - GENERAL: PAINLEVEL: 0-NO PAIN

## 2024-02-08 NOTE — PROGRESS NOTES
PRIOR NOTES  78-year-old male referred to me for gross hematuria  PMH: BPH, CKD 3, DM, HTN, HLP, obesity BMI 30  No blood thinners  Gross hematuria beginning 12/21, w/ clots  Current smoker - smokes cigars 15-20y 5-8 per day, annika sweet perfectos  No fhx of cancer  Sig urg/freq  Urine culture negative, 12/20/2021  UA 12/20: 3+ hemoglobin  A1c 5.8  CTU: prostate vol 92.9cc, right sided 1.7 cm cystic lesion with high density within it, not clearly a complex cyst versus a small solid mass. Very small cyst throughout both kidneys  Cystoscopy with small bladder tumors, large obstructing prostate with intravesical component  OR 3/15 - small 1.5cm tumors R side both 1.5cm, woodard left  Pathology: TaHG - muscle present and uninvolved.  Severe LUTS  OR 8/16/2022 uncomplicated cystolitholapaxy, biopsy of erythematous areas on the right side     Initial AUA risk - High risk (multifocal, TaHG)     Bladder cancer mgmt:  3/2022 - initial TURBT TaHG muscle present and neg  04/2022 - 06/2022 - induction BCG full strength 6/6 07/11/2022 -erythema right lateral wall, bladder stone  08/16/22 - bladder biopsy + fulguration -benign  09/2022 - 10/2022 - Maintenance BCG 3/3 90  11/14/22 -surveillance cystoscopy with persistent erythema  11/14/22 - cytology +  OR 1/3/23 - bladder biopsies + selective cytology - all benign  4/6/2023-surveillance cystoscopy with no evidence of disease  06/2023 - BCG 3/3 maintenance completed  10/2023 - soft bladder stone, CATHY     UPDATED SUBJECTIVE HISTORY  02/08/24 -patient is having gross hematuria, urgency, frequency concerning for UTI    Past Medical History  He has a past medical history of Bladder calculi (10/12/2023), Impacted cerumen, right ear (09/05/2019), Other specified disorders of kidney and ureter (02/11/2022), Personal history of nicotine dependence (09/20/2021), Personal history of other diseases of urinary system (02/11/2022), Personal history of urinary (tract) infections (12/20/2021),  "and Unspecified symptoms and signs involving the genitourinary system.    Surgical History  He has a past surgical history that includes Other surgical history (04/07/2022).     Social History  He reports that he quit smoking about 23 months ago. His smoking use included cigars. He has never used smokeless tobacco. No history on file for alcohol use and drug use.    Family History  Family History   Problem Relation Name Age of Onset    Diabetes Mother      Hypertension Maternal Grandfather          Allergies  Patient has no known allergies.    ROS: 12 system review was completed and is negative with the exception of those signs and symptoms noted in the history of present illness: A 12 system review was completed and is negative with the exception of those signs and symptoms noted in the history of present illness.     Exam:  General: in NAD, appears stated age  Head: normocephalic, atraumatic  Respiratory: normal effort, no use of accessory muscles  Cardiovascular: no edema noted  Skin: normal turgor, no rashes  Neurologic: grossly intact, oriented to person/place/time  Psychiatric: mode and affect appropriate       Last Recorded Vitals  Blood pressure 172/73, pulse 77, resp. rate 18, height 1.626 m (5' 4\"), weight 103 kg (226 lb 12.8 oz).    Lab Results   Component Value Date    CREATININE 1.18 10/19/2023    HGB 12.1 (L) 08/09/2022         ASSESSMENT/PLAN:  # UTI symptoms  -UA, urine culture  -Bactrim DS x 7 days twice daily for complicated UTI  -Reschedule cystoscopy    Sacha Delgado MD    "

## 2024-02-09 LAB — BACTERIA UR CULT: NORMAL

## 2024-02-19 ENCOUNTER — PROCEDURE VISIT (OUTPATIENT)
Dept: UROLOGY | Facility: CLINIC | Age: 79
End: 2024-02-19
Payer: MEDICARE

## 2024-02-19 VITALS
WEIGHT: 230 LBS | DIASTOLIC BLOOD PRESSURE: 72 MMHG | BODY MASS INDEX: 39.48 KG/M2 | HEART RATE: 73 BPM | SYSTOLIC BLOOD PRESSURE: 176 MMHG | TEMPERATURE: 98.2 F

## 2024-02-19 DIAGNOSIS — R39.9 UTI SYMPTOMS: Primary | ICD-10-CM

## 2024-02-19 DIAGNOSIS — R35.1 BPH ASSOCIATED WITH NOCTURIA: ICD-10-CM

## 2024-02-19 DIAGNOSIS — C67.9 CANCER OF BLADDER WALL (MULTI): ICD-10-CM

## 2024-02-19 DIAGNOSIS — N40.1 BPH ASSOCIATED WITH NOCTURIA: ICD-10-CM

## 2024-02-19 LAB
POC APPEARANCE, URINE: CLEAR
POC BILIRUBIN, URINE: NEGATIVE
POC BLOOD, URINE: ABNORMAL
POC COLOR, URINE: YELLOW
POC GLUCOSE, URINE: NEGATIVE MG/DL
POC KETONES, URINE: NEGATIVE MG/DL
POC LEUKOCYTES, URINE: NEGATIVE
POC NITRITE,URINE: NEGATIVE
POC PH, URINE: 6 PH
POC PROTEIN, URINE: ABNORMAL MG/DL
POC SPECIFIC GRAVITY, URINE: >=1.03
POC UROBILINOGEN, URINE: 0.2 EU/DL

## 2024-02-19 PROCEDURE — 81003 URINALYSIS AUTO W/O SCOPE: CPT | Performed by: UROLOGY

## 2024-02-19 PROCEDURE — 99214 OFFICE O/P EST MOD 30 MIN: CPT | Performed by: UROLOGY

## 2024-02-19 PROCEDURE — 52000 CYSTOURETHROSCOPY: CPT | Performed by: UROLOGY

## 2024-02-19 NOTE — PROGRESS NOTES
PRIOR NOTES  78-year-old male referred to me for gross hematuria  PMH: BPH, CKD 3, DM, HTN, HLP, obesity BMI 30  No blood thinners  Gross hematuria beginning 12/21, w/ clots  Current smoker - smokes cigars 15-20y 5-8 per day, annika sweet perfectos  No fhx of cancer  Sig urg/freq  Urine culture negative, 12/20/2021  UA 12/20: 3+ hemoglobin  A1c 5.8  CTU: prostate vol 92.9cc, right sided 1.7 cm cystic lesion with high density within it, not clearly a complex cyst versus a small solid mass. Very small cyst throughout both kidneys  Cystoscopy with small bladder tumors, large obstructing prostate with intravesical component  OR 3/15 - small 1.5cm tumors R side both 1.5cm, woodard left  Pathology: [facility] - muscle present and uninvolved.  Severe LUTS  OR 8/16/2022 uncomplicated cystolitholapaxy, biopsy of erythematous areas on the right side     Initial AUA risk - High risk (multifocal, Ta[facility])     Bladder cancer mgmt:  3/2022 - initial TURBT Ta[facility] muscle present and neg  04/2022 - 06/2022 - induction BCG full strength 6/6 07/11/2022 -erythema right lateral wall, bladder stone  08/16/22 - bladder biopsy + fulguration -benign  09/2022 - 10/2022 - Maintenance BCG 3/3 90  11/14/22 -surveillance cystoscopy with persistent erythema  11/14/22 - cytology +  OR 1/3/23 - bladder biopsies + selective cytology - all benign  4/6/2023-surveillance cystoscopy with no evidence of disease  06/2023 - BCG 3/3 maintenance completed  10/2023 - CATHY    Treated w/ antibiotics for LUTS    UPDATED SUBJECTIVE HISTORY  02/19/24 - Having urinary freq and urge associated with hematuria as well.  Completed his antibiotics and has improvement in the dysuria.  Urine culture was negative    Past Medical History  He has a past medical history of Bladder calculi (10/12/2023), Impacted cerumen, right ear (09/05/2019), Other specified disorders of kidney and ureter (02/11/2022), Personal history of nicotine dependence (09/20/2021), Personal history of other  diseases of urinary system (02/11/2022), Personal history of urinary (tract) infections (12/20/2021), and Unspecified symptoms and signs involving the genitourinary system.    Surgical History  He has a past surgical history that includes Other surgical history (04/07/2022).     Social History  He reports that he quit smoking about 1 years ago. His smoking use included cigars. He has never used smokeless tobacco. No history on file for alcohol use and drug use.    Family History  Family History   Problem Relation Name Age of Onset    Diabetes Mother      Hypertension Maternal Grandfather          Allergies  Patient has no known allergies.    ROS: 12 system review was completed and is negative with the exception of those signs and symptoms noted in the history of present illness: A 12 system review was completed and is negative with the exception of those signs and symptoms noted in the history of present illness.     Exam:  General: in NAD, appears stated age  Head: normocephalic, atraumatic  Respiratory: normal effort, no use of accessory muscles  Cardiovascular: no edema noted  Skin: normal turgor, no rashes  Neurologic: grossly intact, oriented to person/place/time  Psychiatric: mode and affect appropriate    Patient ID: Mario Alberto Segal is a 78 y.o. male.    Cystoscopy    Date/Time: 2/19/2024 10:14 AM    Performed by: Sacha Delgado MD  Authorized by: Sacha Delgado MD      Comments:      The R/B/A to the following procedure were discussed and an informed consent was signed. A time-out was performed confirming the correct procedure, laterality (if applicable), and plan.    The patient was prepped and draped in the standard sterile fashion. A 16 Bulgarian flexible cystoscope was inserted through the penis. The following finding were noted:    Anterior urethra -normal  Prostate -marked enlargement of lateral lobes and median lobes, friable vessels  Bladder mucosa-erythema on the posterior wall, no obviously  recurrent tumor, bladder stone roughly 1.5 cm located within his dependent bladder  Ureteral orifices -visualized in orthotopic position    The patient tolerated the procedure well.        Last Recorded Vitals  Blood pressure 176/72, pulse 73, temperature 36.8 °C (98.2 °F), temperature source Temporal, weight 104 kg (230 lb).    Lab Results   Component Value Date    CREATININE 1.18 10/19/2023    HGB 12.1 (L) 08/09/2022         ASSESSMENT/PLAN:  78-year-old male with BPH and bladder cancer.  No evidence of disease  -Surveillance cystoscopy 4 months    Regarding BPH and bladder stone.  I think the bladder stone needs to be treated given his urinary symptoms  -Optimal treatment options for patient including holmium enucleation, possibly a Rezum given the patient's anesthetic concerns  -He expressed that he is not interested in any sort of surgical intervention right now given 1 his medical frailty and to, the fact that his wife needs constant care and they do not have any help right now.  He would not feel comfortable leaving her alone even for 1 day for surgical intervention  -We will rediscuss in 4 months    Sacha Delgado MD

## 2024-03-06 ENCOUNTER — TELEPHONE (OUTPATIENT)
Dept: PRIMARY CARE | Facility: CLINIC | Age: 79
End: 2024-03-06
Payer: MEDICARE

## 2024-03-07 DIAGNOSIS — I10 ESSENTIAL (PRIMARY) HYPERTENSION: ICD-10-CM

## 2024-03-07 DIAGNOSIS — I10 ESSENTIAL HYPERTENSION: ICD-10-CM

## 2024-03-07 RX ORDER — LOSARTAN POTASSIUM 100 MG/1
100 TABLET ORAL DAILY
Qty: 100 TABLET | Refills: 3 | Status: SHIPPED | OUTPATIENT
Start: 2024-03-07 | End: 2025-04-11

## 2024-03-07 RX ORDER — HYDRALAZINE HYDROCHLORIDE 25 MG/1
25 TABLET, FILM COATED ORAL 3 TIMES DAILY
Qty: 270 TABLET | Refills: 1 | Status: SHIPPED | OUTPATIENT
Start: 2024-03-07

## 2024-03-12 ENCOUNTER — TELEPHONE (OUTPATIENT)
Dept: PRIMARY CARE | Facility: CLINIC | Age: 79
End: 2024-03-12
Payer: MEDICARE

## 2024-03-12 DIAGNOSIS — N18.31 STAGE 3A CHRONIC KIDNEY DISEASE (MULTI): Primary | ICD-10-CM

## 2024-03-12 DIAGNOSIS — E11.22 TYPE 2 DIABETES MELLITUS WITH STAGE 3A CHRONIC KIDNEY DISEASE, WITHOUT LONG-TERM CURRENT USE OF INSULIN (MULTI): ICD-10-CM

## 2024-03-12 DIAGNOSIS — N18.31 TYPE 2 DIABETES MELLITUS WITH STAGE 3A CHRONIC KIDNEY DISEASE, WITHOUT LONG-TERM CURRENT USE OF INSULIN (MULTI): ICD-10-CM

## 2024-04-01 ENCOUNTER — APPOINTMENT (OUTPATIENT)
Dept: PRIMARY CARE | Facility: CLINIC | Age: 79
End: 2024-04-01
Payer: MEDICARE

## 2024-04-05 DIAGNOSIS — I10 ESSENTIAL HYPERTENSION: ICD-10-CM

## 2024-04-05 RX ORDER — AMLODIPINE BESYLATE 10 MG/1
10 TABLET ORAL DAILY
Qty: 90 TABLET | Refills: 1 | Status: SHIPPED | OUTPATIENT
Start: 2024-04-05

## 2024-04-15 ENCOUNTER — OFFICE VISIT (OUTPATIENT)
Dept: PRIMARY CARE | Facility: CLINIC | Age: 79
End: 2024-04-15
Payer: MEDICARE

## 2024-04-15 VITALS
DIASTOLIC BLOOD PRESSURE: 65 MMHG | WEIGHT: 237.25 LBS | TEMPERATURE: 98.6 F | SYSTOLIC BLOOD PRESSURE: 168 MMHG | OXYGEN SATURATION: 94 % | HEART RATE: 74 BPM | BODY MASS INDEX: 40.72 KG/M2 | RESPIRATION RATE: 18 BRPM

## 2024-04-15 DIAGNOSIS — I89.0 LYMPHEDEMA: ICD-10-CM

## 2024-04-15 DIAGNOSIS — E11.22 TYPE 2 DIABETES MELLITUS WITH STAGE 3A CHRONIC KIDNEY DISEASE, WITHOUT LONG-TERM CURRENT USE OF INSULIN (MULTI): Primary | ICD-10-CM

## 2024-04-15 DIAGNOSIS — N18.31 TYPE 2 DIABETES MELLITUS WITH STAGE 3A CHRONIC KIDNEY DISEASE, WITHOUT LONG-TERM CURRENT USE OF INSULIN (MULTI): Primary | ICD-10-CM

## 2024-04-15 DIAGNOSIS — E66.01 OBESITY, MORBID (MULTI): ICD-10-CM

## 2024-04-15 LAB — POC HEMOGLOBIN A1C: 6.8 % (ref 4.2–6.5)

## 2024-04-15 PROCEDURE — G2211 COMPLEX E/M VISIT ADD ON: HCPCS | Performed by: STUDENT IN AN ORGANIZED HEALTH CARE EDUCATION/TRAINING PROGRAM

## 2024-04-15 PROCEDURE — 1036F TOBACCO NON-USER: CPT | Performed by: STUDENT IN AN ORGANIZED HEALTH CARE EDUCATION/TRAINING PROGRAM

## 2024-04-15 PROCEDURE — 1160F RVW MEDS BY RX/DR IN RCRD: CPT | Performed by: STUDENT IN AN ORGANIZED HEALTH CARE EDUCATION/TRAINING PROGRAM

## 2024-04-15 PROCEDURE — 3078F DIAST BP <80 MM HG: CPT | Performed by: STUDENT IN AN ORGANIZED HEALTH CARE EDUCATION/TRAINING PROGRAM

## 2024-04-15 PROCEDURE — 1159F MED LIST DOCD IN RCRD: CPT | Performed by: STUDENT IN AN ORGANIZED HEALTH CARE EDUCATION/TRAINING PROGRAM

## 2024-04-15 PROCEDURE — 83036 HEMOGLOBIN GLYCOSYLATED A1C: CPT | Performed by: STUDENT IN AN ORGANIZED HEALTH CARE EDUCATION/TRAINING PROGRAM

## 2024-04-15 PROCEDURE — 3077F SYST BP >= 140 MM HG: CPT | Performed by: STUDENT IN AN ORGANIZED HEALTH CARE EDUCATION/TRAINING PROGRAM

## 2024-04-15 PROCEDURE — 99214 OFFICE O/P EST MOD 30 MIN: CPT | Performed by: STUDENT IN AN ORGANIZED HEALTH CARE EDUCATION/TRAINING PROGRAM

## 2024-04-15 RX ORDER — FUROSEMIDE 20 MG/1
20 TABLET ORAL DAILY
Qty: 30 TABLET | Refills: 0 | Status: SHIPPED | OUTPATIENT
Start: 2024-04-15 | End: 2024-05-06 | Stop reason: SDUPTHER

## 2024-04-15 ASSESSMENT — ENCOUNTER SYMPTOMS
NAUSEA: 0
LIGHT-HEADEDNESS: 0
FEVER: 0
DIZZINESS: 0
DIABETIC ASSOCIATED SYMPTOMS: 0
SHORTNESS OF BREATH: 0
VOMITING: 0

## 2024-04-15 NOTE — PROGRESS NOTES
Subjective   Mario Alberto Segal is a 78 y.o. male who presents for Follow-up (Pt here today for 6 month F/U for kidneys and to discuss lab work.).  Patient hemoglobin A1c 6.8% today.    Patient also complains of lower extremity swelling, patient is chronic lymphedema but had an episode 1 month prior where both legs had worsening swelling, had redness, redness is starting to deteriorate.  There is no pain pain, there is mild drainage from the right lower extremity.  Patient is using compression stockings and antibacterial cream at home.    Patient has not seen physical therapy or wound clinic, does not want to currently at this time.    Denies any shortness of breath, chest pain, lightheadedness, dizziness or other symptoms at this time.    Diabetes  He presents for his follow-up diabetic visit. He has type 2 diabetes mellitus. His disease course has been stable. There are no hypoglycemic associated symptoms. Pertinent negatives for hypoglycemia include no dizziness. There are no diabetic associated symptoms. Pertinent negatives for diabetes include no chest pain. There are no hypoglycemic complications. Symptoms are stable. There are no diabetic complications. Risk factors for coronary artery disease include diabetes mellitus, family history, obesity, sedentary lifestyle and hypertension. Current diabetic treatment includes oral agent (triple therapy). He is compliant with treatment all of the time. He has not had a previous visit with a dietitian. He rarely participates in exercise. An ACE inhibitor/angiotensin II receptor blocker is being taken. He does not see a podiatrist.Eye exam is current.       Review of Systems   Constitutional:  Negative for fever.   Respiratory:  Negative for shortness of breath.    Cardiovascular:  Negative for chest pain.   Gastrointestinal:  Negative for nausea and vomiting.   Neurological:  Negative for dizziness and light-headedness.   All other systems reviewed and are  negative.      Objective   Physical Exam  Vitals reviewed.   Constitutional:       General: He is not in acute distress.     Appearance: Normal appearance. He is not toxic-appearing.   HENT:      Head: Normocephalic and atraumatic.      Nose: Nose normal.   Eyes:      Extraocular Movements: Extraocular movements intact.   Cardiovascular:      Rate and Rhythm: Normal rate and regular rhythm.      Heart sounds: No murmur heard.     No friction rub. No gallop.   Pulmonary:      Effort: Pulmonary effort is normal. No respiratory distress.      Breath sounds: Normal breath sounds. No wheezing, rhonchi or rales.   Musculoskeletal:      Comments: Chronic lymphedema both lower extremities, pink color nontender to palpation.    Mild draining and crusting of right lower extremity, 2+ pitting edema associated.   Skin:     General: Skin is warm and dry.   Neurological:      General: No focal deficit present.      Mental Status: He is alert.   Psychiatric:         Mood and Affect: Mood normal.         Behavior: Behavior normal.         Assessment/Plan   Problem List Items Addressed This Visit       Type 2 diabetes mellitus with stage 3a chronic kidney disease, without long-term current use of insulin (Multi) - Primary    Relevant Orders    POCT glycosylated hemoglobin (Hb A1C) manually resulted (Completed)     Other Visit Diagnoses       Lymphedema        Relevant Medications    furosemide (Lasix) 20 mg tablet          Lab Results   Component Value Date    HGBA1C 6.8 (A) 04/15/2024     Continue treatment including: Metformin, GLP-1/terzepatide, and pioglitazone/TZD  Complications include: Nephropathy  Continue to monitor blood sugars  Continue with follow ups including:   Ophthalmology: Up-to-date  Podiatry: Not Indicated  Cardiology: Not Indicated  Discussed healthy, low carb diet and trying to get 150 minutes of physical activity per week.      For patient's lower extremity swelling, there is pitting edema associated, acute  swelling with chronic lymphedema    Discussed wound care versus physical therapy, patient deferred at this time.    Opted for 2-week treatment with furosemide patient will follow-up at that time.    Low threshold to continue with referrals if symptoms persist at that time.

## 2024-05-06 ENCOUNTER — OFFICE VISIT (OUTPATIENT)
Dept: PRIMARY CARE | Facility: CLINIC | Age: 79
End: 2024-05-06
Payer: MEDICARE

## 2024-05-06 VITALS
RESPIRATION RATE: 18 BRPM | TEMPERATURE: 98.2 F | SYSTOLIC BLOOD PRESSURE: 166 MMHG | DIASTOLIC BLOOD PRESSURE: 65 MMHG | BODY MASS INDEX: 41.22 KG/M2 | WEIGHT: 240.13 LBS | HEART RATE: 69 BPM | OXYGEN SATURATION: 95 %

## 2024-05-06 DIAGNOSIS — I89.0 LYMPHEDEMA: Primary | ICD-10-CM

## 2024-05-06 DIAGNOSIS — R06.01 ORTHOPNEA: ICD-10-CM

## 2024-05-06 PROCEDURE — 99214 OFFICE O/P EST MOD 30 MIN: CPT | Performed by: STUDENT IN AN ORGANIZED HEALTH CARE EDUCATION/TRAINING PROGRAM

## 2024-05-06 PROCEDURE — G2211 COMPLEX E/M VISIT ADD ON: HCPCS | Performed by: STUDENT IN AN ORGANIZED HEALTH CARE EDUCATION/TRAINING PROGRAM

## 2024-05-06 PROCEDURE — 1159F MED LIST DOCD IN RCRD: CPT | Performed by: STUDENT IN AN ORGANIZED HEALTH CARE EDUCATION/TRAINING PROGRAM

## 2024-05-06 PROCEDURE — 1160F RVW MEDS BY RX/DR IN RCRD: CPT | Performed by: STUDENT IN AN ORGANIZED HEALTH CARE EDUCATION/TRAINING PROGRAM

## 2024-05-06 PROCEDURE — 3077F SYST BP >= 140 MM HG: CPT | Performed by: STUDENT IN AN ORGANIZED HEALTH CARE EDUCATION/TRAINING PROGRAM

## 2024-05-06 PROCEDURE — 3078F DIAST BP <80 MM HG: CPT | Performed by: STUDENT IN AN ORGANIZED HEALTH CARE EDUCATION/TRAINING PROGRAM

## 2024-05-06 PROCEDURE — 1036F TOBACCO NON-USER: CPT | Performed by: STUDENT IN AN ORGANIZED HEALTH CARE EDUCATION/TRAINING PROGRAM

## 2024-05-06 RX ORDER — FUROSEMIDE 20 MG/1
20 TABLET ORAL DAILY
Qty: 30 TABLET | Refills: 0 | Status: SHIPPED | OUTPATIENT
Start: 2024-05-06 | End: 2024-06-05

## 2024-05-06 ASSESSMENT — ENCOUNTER SYMPTOMS
VOMITING: 0
LIGHT-HEADEDNESS: 0
NAUSEA: 0
DIZZINESS: 0
FEVER: 0
SHORTNESS OF BREATH: 0

## 2024-05-06 NOTE — PROGRESS NOTES
Subjective   Mario Alberto Segal is a 78 y.o. male who presents for Edema (Pt here today to F/U for edema to both legs.).  Patient seen today for follow-up of lower extremity swelling.  Patient has of chronic lymphedema but had progressive worsening, redness associated.    No pain, no difficulty with ambulation.  Patient is not had any sedentary experiences.    Overall improving since starting Lasix, has noticed decreased urinary output, some decreased swelling as well.    Blisters on the lower extremities are improving as well.    Edema    Pertinent negative symptoms include no chest pain, no fever, no nausea and no vomiting.       Review of Systems   Constitutional:  Negative for fever.   Respiratory:  Negative for shortness of breath.    Cardiovascular:  Negative for chest pain.   Gastrointestinal:  Negative for nausea and vomiting.   Neurological:  Negative for dizziness and light-headedness.   All other systems reviewed and are negative.      Objective   Physical Exam  Vitals reviewed.   Constitutional:       General: He is not in acute distress.     Appearance: Normal appearance. He is not toxic-appearing.   HENT:      Head: Normocephalic and atraumatic.      Nose: Nose normal.   Eyes:      Extraocular Movements: Extraocular movements intact.   Cardiovascular:      Rate and Rhythm: Normal rate and regular rhythm.      Heart sounds: No murmur heard.     No friction rub. No gallop.   Pulmonary:      Effort: Pulmonary effort is normal. No respiratory distress.      Breath sounds: Normal breath sounds. No wheezing, rhonchi or rales.   Musculoskeletal:      Comments: Chronic lymphedema both lower extremities, pink color nontender to palpation.    Improving scabbing of lower extremities, persistent 1+ pitting edema   Skin:     General: Skin is warm and dry.   Neurological:      General: No focal deficit present.      Mental Status: He is alert.   Psychiatric:         Mood and Affect: Mood normal.         Behavior:  Behavior normal.         Assessment/Plan   Problem List Items Addressed This Visit    None  Visit Diagnoses       Lymphedema    -  Primary    Relevant Medications    furosemide (Lasix) 20 mg tablet    Other Relevant Orders    B-type natriuretic peptide    Renal function panel    Orthopnea        Relevant Orders    B-type natriuretic peptide          Patient seen today for follow-up lower extremity swelling.  Did discuss wound care or lymphedema physical therapy referral.  Patient deferred at this time.    Will continue Lasix for 30 days, we will follow-up with lab work at that time.    Follow-up sooner if any new concerns arise.

## 2024-05-13 DIAGNOSIS — E11.9 TYPE 2 DIABETES MELLITUS WITHOUT COMPLICATIONS (MULTI): ICD-10-CM

## 2024-05-13 RX ORDER — METFORMIN HYDROCHLORIDE 1000 MG/1
TABLET ORAL
Qty: 180 TABLET | Refills: 3 | Status: SHIPPED | OUTPATIENT
Start: 2024-05-13

## 2024-05-25 DIAGNOSIS — I11.9 HYPERTENSIVE HEART DISEASE, UNSPECIFIED WHETHER HEART FAILURE PRESENT: ICD-10-CM

## 2024-06-04 ENCOUNTER — TELEPHONE (OUTPATIENT)
Dept: PRIMARY CARE | Facility: CLINIC | Age: 79
End: 2024-06-04
Payer: MEDICARE

## 2024-06-04 DIAGNOSIS — I11.9 HYPERTENSIVE HEART DISEASE, UNSPECIFIED WHETHER HEART FAILURE PRESENT: ICD-10-CM

## 2024-06-04 RX ORDER — EZETIMIBE AND SIMVASTATIN 10; 40 MG/1; MG/1
1 TABLET ORAL EVERY EVENING
Qty: 90 TABLET | Refills: 1 | Status: SHIPPED | OUTPATIENT
Start: 2024-06-04 | End: 2024-06-06 | Stop reason: WASHOUT

## 2024-06-05 DIAGNOSIS — I89.0 LYMPHEDEMA: ICD-10-CM

## 2024-06-05 RX ORDER — FUROSEMIDE 20 MG/1
20 TABLET ORAL DAILY
Qty: 90 TABLET | Refills: 1 | Status: SHIPPED | OUTPATIENT
Start: 2024-06-05

## 2024-06-05 RX ORDER — EZETIMIBE AND SIMVASTATIN 10; 40 MG/1; MG/1
1 TABLET ORAL EVERY EVENING
Qty: 90 TABLET | Refills: 1 | Status: SHIPPED | OUTPATIENT
Start: 2024-06-05

## 2024-06-06 ENCOUNTER — OFFICE VISIT (OUTPATIENT)
Dept: PRIMARY CARE | Facility: CLINIC | Age: 79
End: 2024-06-06
Payer: MEDICARE

## 2024-06-06 VITALS
BODY MASS INDEX: 41.54 KG/M2 | SYSTOLIC BLOOD PRESSURE: 166 MMHG | RESPIRATION RATE: 18 BRPM | WEIGHT: 242 LBS | HEART RATE: 74 BPM | TEMPERATURE: 98.3 F | DIASTOLIC BLOOD PRESSURE: 66 MMHG | OXYGEN SATURATION: 92 %

## 2024-06-06 DIAGNOSIS — E11.22 TYPE 2 DIABETES MELLITUS WITH STAGE 3A CHRONIC KIDNEY DISEASE, WITHOUT LONG-TERM CURRENT USE OF INSULIN (MULTI): Primary | ICD-10-CM

## 2024-06-06 DIAGNOSIS — N18.31 TYPE 2 DIABETES MELLITUS WITH STAGE 3A CHRONIC KIDNEY DISEASE, WITHOUT LONG-TERM CURRENT USE OF INSULIN (MULTI): Primary | ICD-10-CM

## 2024-06-06 DIAGNOSIS — N18.31 STAGE 3A CHRONIC KIDNEY DISEASE (MULTI): ICD-10-CM

## 2024-06-06 DIAGNOSIS — I89.0 LYMPHEDEMA: ICD-10-CM

## 2024-06-06 PROCEDURE — G2211 COMPLEX E/M VISIT ADD ON: HCPCS | Performed by: STUDENT IN AN ORGANIZED HEALTH CARE EDUCATION/TRAINING PROGRAM

## 2024-06-06 PROCEDURE — 3078F DIAST BP <80 MM HG: CPT | Performed by: STUDENT IN AN ORGANIZED HEALTH CARE EDUCATION/TRAINING PROGRAM

## 2024-06-06 PROCEDURE — 3077F SYST BP >= 140 MM HG: CPT | Performed by: STUDENT IN AN ORGANIZED HEALTH CARE EDUCATION/TRAINING PROGRAM

## 2024-06-06 PROCEDURE — 1160F RVW MEDS BY RX/DR IN RCRD: CPT | Performed by: STUDENT IN AN ORGANIZED HEALTH CARE EDUCATION/TRAINING PROGRAM

## 2024-06-06 PROCEDURE — 99214 OFFICE O/P EST MOD 30 MIN: CPT | Performed by: STUDENT IN AN ORGANIZED HEALTH CARE EDUCATION/TRAINING PROGRAM

## 2024-06-06 PROCEDURE — 1036F TOBACCO NON-USER: CPT | Performed by: STUDENT IN AN ORGANIZED HEALTH CARE EDUCATION/TRAINING PROGRAM

## 2024-06-06 PROCEDURE — 1159F MED LIST DOCD IN RCRD: CPT | Performed by: STUDENT IN AN ORGANIZED HEALTH CARE EDUCATION/TRAINING PROGRAM

## 2024-06-06 ASSESSMENT — ENCOUNTER SYMPTOMS
NAUSEA: 0
VOMITING: 0
SHORTNESS OF BREATH: 0
LIGHT-HEADEDNESS: 0
DIZZINESS: 0
FEVER: 0

## 2024-06-06 NOTE — PROGRESS NOTES
Subjective   Mario Alberto Segal is a 78 y.o. male who presents for Follow-up (Pt here today for 1 month F/U for edema to legs and lab results).  Patient seen today for follow-up of lower extremity swelling.  Patient has of chronic lymphedema but had progressive worsening, redness associated.    No pain, no difficulty with ambulation.  Patient is not had any sedentary experiences.    Overall improving since starting Lasix, is currently on them most days, does not take them if he is going out or if there is no significant abnormalities with this.    Reviewed BMP, renal function near baseline.    Edema    Pertinent negative symptoms include no chest pain, no fever, no nausea and no vomiting.       Review of Systems   Constitutional:  Negative for fever.   Respiratory:  Negative for shortness of breath.    Cardiovascular:  Negative for chest pain.   Gastrointestinal:  Negative for nausea and vomiting.   Neurological:  Negative for dizziness and light-headedness.   All other systems reviewed and are negative.      Objective   Physical Exam  Vitals reviewed.   Constitutional:       General: He is not in acute distress.     Appearance: Normal appearance. He is not toxic-appearing.   HENT:      Head: Normocephalic and atraumatic.      Nose: Nose normal.   Eyes:      Extraocular Movements: Extraocular movements intact.   Cardiovascular:      Rate and Rhythm: Normal rate and regular rhythm.      Heart sounds: No murmur heard.     No friction rub. No gallop.   Pulmonary:      Effort: Pulmonary effort is normal. No respiratory distress.      Breath sounds: Normal breath sounds. No wheezing, rhonchi or rales.   Musculoskeletal:      Comments: Chronic lymphedema both lower extremities, pink color nontender to palpation.    Improving scabbing of lower extremities, persistent 1+ pitting edema   Skin:     General: Skin is warm and dry.   Neurological:      General: No focal deficit present.      Mental Status: He is alert.    Psychiatric:         Mood and Affect: Mood normal.         Behavior: Behavior normal.         Assessment/Plan   Problem List Items Addressed This Visit       Chronic kidney disease (CKD), stage III (moderate) (Multi)    Type 2 diabetes mellitus with stage 3a chronic kidney disease, without long-term current use of insulin (Multi) - Primary    Lymphedema       Patient seen today for follow-up lower extremity swelling.  Did discuss wound care or lymphedema physical therapy referral.  Patient deferred at this time.    Continue Lasix only as needed.  Continually monitor renal function every 3 months.    Follow-up in 3 months for repeat evaluation.    Discussed diabetic medications, when he is due for his next refill we will increase his Victoza and discontinue pioglitazone.  Currently in the well-controlled range and want to limit risk factors for lower extremity swelling and cardiomyopathy.    Follow-up sooner if any new concerns arise.

## 2024-06-06 NOTE — PATIENT INSTRUCTIONS
I am glad to see you are doing better with the furosemide. Continue it only as needed.    When you are getting ready to  the Victoza again, give us a call. We will increase the Victoza dose and we will stop the pioglitazone.    Any questions or concerns, please let us know.    Itz Talbert, DO

## 2024-06-21 ENCOUNTER — APPOINTMENT (OUTPATIENT)
Dept: UROLOGY | Facility: CLINIC | Age: 79
End: 2024-06-21
Payer: MEDICARE

## 2024-06-22 DIAGNOSIS — E11.9 TYPE 2 DIABETES MELLITUS WITHOUT COMPLICATIONS (MULTI): ICD-10-CM

## 2024-06-24 RX ORDER — PIOGLITAZONEHYDROCHLORIDE 30 MG/1
30 TABLET ORAL DAILY
Qty: 90 TABLET | Refills: 2 | Status: SHIPPED | OUTPATIENT
Start: 2024-06-24

## 2024-07-08 ENCOUNTER — TELEPHONE (OUTPATIENT)
Dept: PRIMARY CARE | Facility: CLINIC | Age: 79
End: 2024-07-08
Payer: MEDICARE

## 2024-07-08 DIAGNOSIS — E11.22 TYPE 2 DIABETES MELLITUS WITH STAGE 3A CHRONIC KIDNEY DISEASE, WITHOUT LONG-TERM CURRENT USE OF INSULIN (MULTI): Primary | ICD-10-CM

## 2024-07-08 DIAGNOSIS — N18.31 TYPE 2 DIABETES MELLITUS WITH STAGE 3A CHRONIC KIDNEY DISEASE, WITHOUT LONG-TERM CURRENT USE OF INSULIN (MULTI): Primary | ICD-10-CM

## 2024-07-08 NOTE — TELEPHONE ENCOUNTER
Rx Refill Request Telephone Encounter    Name:  Mario Alberto Segal  :  [id number]  Medication Name:    liraglutide (Victoza 3-Dirk) 0.6 mg/0.1 mL (18 mg/3 mL) injection        Pt states Dr Talbert wanted to increase  Victoza from 1.2 to 1.8 and stop  Pioglitazone.            Specific Pharmacy location:    [facility]/pharmacy #[id number] [address] AT [place] [id number]  Phone: [phone]  Fax: [phone]  VICK #: [id number]

## 2024-07-11 ENCOUNTER — TELEPHONE (OUTPATIENT)
Dept: PRIMARY CARE | Facility: CLINIC | Age: 79
End: 2024-07-11
Payer: MEDICARE

## 2024-07-11 DIAGNOSIS — N18.31 TYPE 2 DIABETES MELLITUS WITH STAGE 3A CHRONIC KIDNEY DISEASE, WITHOUT LONG-TERM CURRENT USE OF INSULIN (MULTI): Primary | ICD-10-CM

## 2024-07-11 DIAGNOSIS — E11.22 TYPE 2 DIABETES MELLITUS WITH STAGE 3A CHRONIC KIDNEY DISEASE, WITHOUT LONG-TERM CURRENT USE OF INSULIN (MULTI): Primary | ICD-10-CM

## 2024-08-12 ENCOUNTER — TELEPHONE (OUTPATIENT)
Dept: PRIMARY CARE | Facility: CLINIC | Age: 79
End: 2024-08-12
Payer: MEDICARE

## 2024-08-12 DIAGNOSIS — N18.31 TYPE 2 DIABETES MELLITUS WITH STAGE 3A CHRONIC KIDNEY DISEASE, WITHOUT LONG-TERM CURRENT USE OF INSULIN (MULTI): Primary | ICD-10-CM

## 2024-08-12 DIAGNOSIS — E11.22 TYPE 2 DIABETES MELLITUS WITH STAGE 3A CHRONIC KIDNEY DISEASE, WITHOUT LONG-TERM CURRENT USE OF INSULIN (MULTI): Primary | ICD-10-CM

## 2024-08-12 NOTE — TELEPHONE ENCOUNTER
Rx Refill Request Telephone Encounter    Name:  Mario Alberto Segal  :  [id number]  Medication Name:    semaglutide (OZEMPIC) 1 mg/dose (4 mg/3 mL) pen injector (Discontinued)        Sig: Inject 1 mg under the skin 1 (one) time per week.        Sent to pharmacy as: semaglutide 1 mg/dose (4 mg/3 mL) subcutaneous pen injector (OZEMPIC)          Specific Pharmacy location:  [facility]/pharmacy #[id number] [place], [place] [id number]  Phone: [phone]  Fax: [phone]     Patient is requesting 3 month supply.

## 2024-08-16 ENCOUNTER — APPOINTMENT (OUTPATIENT)
Dept: UROLOGY | Facility: CLINIC | Age: 79
End: 2024-08-16
Payer: MEDICARE

## 2024-08-26 DIAGNOSIS — I10 PRIMARY HYPERTENSION: Primary | ICD-10-CM

## 2024-08-26 DIAGNOSIS — I10 ESSENTIAL (PRIMARY) HYPERTENSION: ICD-10-CM

## 2024-08-26 RX ORDER — CARVEDILOL 6.25 MG/1
6.25 TABLET ORAL 2 TIMES DAILY
Qty: 180 TABLET | Refills: 3 | Status: SHIPPED | OUTPATIENT
Start: 2024-08-26 | End: 2025-08-26

## 2024-09-20 ENCOUNTER — APPOINTMENT (OUTPATIENT)
Dept: CARDIOLOGY | Facility: CLINIC | Age: 79
End: 2024-09-20
Payer: MEDICARE

## 2024-09-20 VITALS
BODY MASS INDEX: 35.51 KG/M2 | HEART RATE: 72 BPM | DIASTOLIC BLOOD PRESSURE: 62 MMHG | SYSTOLIC BLOOD PRESSURE: 142 MMHG | WEIGHT: 208 LBS | HEIGHT: 64 IN

## 2024-09-20 DIAGNOSIS — I35.8 AORTIC VALVE SCLEROSIS: ICD-10-CM

## 2024-09-20 DIAGNOSIS — R60.0 BILATERAL LEG EDEMA: Primary | ICD-10-CM

## 2024-09-20 DIAGNOSIS — I11.9 HYPERTENSIVE HEART DISEASE WITHOUT HEART FAILURE: ICD-10-CM

## 2024-09-20 DIAGNOSIS — E78.49 OTHER HYPERLIPIDEMIA: ICD-10-CM

## 2024-09-20 PROCEDURE — 99214 OFFICE O/P EST MOD 30 MIN: CPT | Performed by: INTERNAL MEDICINE

## 2024-09-20 PROCEDURE — 1159F MED LIST DOCD IN RCRD: CPT | Performed by: INTERNAL MEDICINE

## 2024-09-20 PROCEDURE — 1160F RVW MEDS BY RX/DR IN RCRD: CPT | Performed by: INTERNAL MEDICINE

## 2024-09-20 PROCEDURE — 1036F TOBACCO NON-USER: CPT | Performed by: INTERNAL MEDICINE

## 2024-09-20 PROCEDURE — 3077F SYST BP >= 140 MM HG: CPT | Performed by: INTERNAL MEDICINE

## 2024-09-20 PROCEDURE — 3078F DIAST BP <80 MM HG: CPT | Performed by: INTERNAL MEDICINE

## 2024-09-20 RX ORDER — HYDRALAZINE HYDROCHLORIDE 100 MG/1
TABLET, FILM COATED ORAL
Qty: 90 TABLET | Refills: 11 | Status: SHIPPED | OUTPATIENT
Start: 2024-09-20

## 2024-09-20 RX ORDER — EMOLLIENT COMBINATION NO.97
OINTMENT (GRAM) TOPICAL
COMMUNITY

## 2024-09-20 NOTE — PATIENT INSTRUCTIONS

## 2024-09-20 NOTE — PROGRESS NOTES
"Chief Complaint:   Please see below.     History Of Present Illness:    Mario Alberto Segal is a 78 y.o. male presenting with hypertensive heart disease, aortic sclerosis.    This 78 year old man has HTN with hypertensive heart disease, DM, and hyperlipidemia. He had no critical coronary stenosis on cath in 2014.. He has normal LV function, with aortic sclerosis on his echo in 9/2022.    His primary care physician started him on furosemide for lower extremity edema.  He developed rising creatinine and was referred to nephrology.  His furosemide dose was increased, amlodipine stopped, pioglitazone stopped, and hydralazine was increased.  With these changes, his lower extremity edema has persisted.    The patient denies chest discomfort, dyspnea, palpitations, orthopnea, PND, syncope, and near syncope.  He rides his stationary bike for 30 minutes every other day.      He has lost 6 lbs since his last visit.           Last Recorded Vitals:  Vitals:    09/20/24 0900   BP: 142/62   BP Location: Left arm   Patient Position: Sitting   Pulse: 72   Weight: 94.3 kg (208 lb)   Height: 1.626 m (5' 4\")       Past Medical History:  He has a past medical history of Bladder calculi (10/12/2023), Impacted cerumen, right ear (09/05/2019), Other specified disorders of kidney and ureter (02/11/2022), Personal history of nicotine dependence (09/20/2021), Personal history of other diseases of urinary system (02/11/2022), Personal history of urinary (tract) infections (12/20/2021), and Unspecified symptoms and signs involving the genitourinary system.    Past Surgical History:  He has a past surgical history that includes Other surgical history (04/07/2022).      Social History:  He reports that he quit smoking about 2 years ago. His smoking use included cigars. He has never used smokeless tobacco. He reports that he does not currently use alcohol. He reports that he does not use drugs.    Family History:  Family History   Problem Relation " "Name Age of Onset    Diabetes Mother      Hypertension Maternal Grandfather          Allergies:  Patient has no known allergies.    Outpatient Medications:  Current Outpatient Medications   Medication Instructions    ASPIRIN ORAL 1 tablet, oral, Daily, Aspirin 81 MG TABS    carvedilol (COREG) 6.25 mg, oral, 2 times daily    cholecalciferol (Vitamin D-3) 50 MCG (2000 UT) tablet 1 tablet, oral, Daily    cyanocobalamin, vitamin B-12, (VITAMIN B-12 ORAL) 1 tablet, oral, Daily    emollient combination no.97 (Aquaphilic) ointment topical (top), Use as directed - As needed.    ezetimibe-simvastatin (Vytorin) 10-40 mg tablet 1 tablet, oral, Every evening    ferrous sulfate 325 (65 Fe) MG tablet 1 tablet, oral, Daily    folic acid/multivit-min/lutein (CENTRUM SILVER ORAL)     furosemide (LASIX) 20 mg, oral, Daily    hydrALAZINE (APRESOLINE) 25 mg, oral, 3 times daily    losartan (COZAAR) 100 mg, oral, Daily    metFORMIN (Glucophage) 1,000 mg tablet TAKE 1 TABLET BY MOUTH EVERY 12 HOURS    nystatin (Mycostatin) 100,000 unit/gram powder Topical,  APPLY 2-3 TIMES DAILY TO AFFECTED AREA(S).<BR>    OneTouch Ultra Test strip 1 strip daily    pen needle, diabetic (BD Geraldine 2nd Gen Pen Needle) 32 gauge x 5/32\" needle USE 1 DAILY    semaglutide (OZEMPIC) 1 mg, subcutaneous, Once Weekly       Physical Exam:  GENERAL:  pleasant 78 year-old  HEENT: No xanthelasma  NECK: Supple, no palpable adenopathy or thyromegaly  CHEST: Clear to auscultation, respiratory effort unlabored  CARDIAC: RRR, normal S1 and S2, no audible murmur, rub, gallop, carotids are brisk, PMI is not displaced  ABD: Active bowel sounds, nontender, no organomegaly, no evidence of ascites  EXT: No clubbing, cyanosis,  or tenderness; there is 3+ bilateral lower extremity edema.  NEURO: Awake, alert, appropriate, speech is fluent       Last Labs:  CBC -  Lab Results   Component Value Date    WBC 6.5 08/09/2022    HGB 12.1 (L) 08/09/2022    HCT 37.8 (L) 08/09/2022    MCV 93 " "08/09/2022     (L) 08/09/2022       CMP -  Lab Results   Component Value Date    CALCIUM 9.7 08/09/2022       LIPID PANEL -   No results found for: \"CHOL\", \"TRIG\", \"HDL\", \"CHHDL\", \"LDLF\", \"VLDL\", \"NHDL\"    RENAL FUNCTION PANEL -   Lab Results   Component Value Date    GLUCOSE 134 (H) 08/09/2022     08/09/2022    K 4.5 08/09/2022     08/09/2022    CO2 26 08/09/2022    ANIONGAP 13 08/09/2022    BUN 22 08/09/2022    CREATININE 1.18 10/19/2023    GFRMALE 81 08/09/2022    CALCIUM 9.7 08/09/2022        Lab Results   Component Value Date    HGBA1C 6.8 (A) 04/15/2024         Lab review: I have Chemistry CMP:   Lab Results   Component Value Date    CALCIUM 9.7 08/09/2022    CO2 26 08/09/2022    CREATININE 1.18 10/19/2023    GLUCOSE 134 (H) 08/09/2022   , Chemistry BMP   Lab Results   Component Value Date    GLUCOSE 134 (H) 08/09/2022    CALCIUM 9.7 08/09/2022    CO2 26 08/09/2022    CREATININE 1.18 10/19/2023   , and CBC:  Lab Results   Component Value Date    WBC 6.5 08/09/2022    RBC 4.05 (L) 08/09/2022    HGB 12.1 (L) 08/09/2022    HCT 37.8 (L) 08/09/2022    MCV 93 08/09/2022    MCHC 32.0 08/09/2022    RDW 12.9 08/09/2022       Assessment/Plan   Assessment & Plan  Bilateral leg edema    Orders:    Vascular US Lower Extremity Venous Insufficiency Bilateral; Future    Follow Up In Cardiology; Future    Hypertensive heart disease without heart failure    Orders:    hydrALAZINE (Apresoline) 100 mg tablet; Take one tablet three times a day    Follow Up In Cardiology; Future    Other hyperlipidemia    Orders:    Follow Up In Cardiology; Future    Aortic valve sclerosis    Orders:    Follow Up In Cardiology; Future    BMI 35.0-35.9,adult    Orders:    Follow Up In Cardiology; Future          Chadwick May MD  "

## 2024-09-20 NOTE — ASSESSMENT & PLAN NOTE
Orders:    hydrALAZINE (Apresoline) 100 mg tablet; Take one tablet three times a day    Follow Up In Cardiology; Future

## 2024-09-20 NOTE — ASSESSMENT & PLAN NOTE
Orders:    Vascular US Lower Extremity Venous Insufficiency Bilateral; Future    Follow Up In Cardiology; Future

## 2024-09-23 ENCOUNTER — TELEPHONE (OUTPATIENT)
Dept: PRIMARY CARE | Facility: CLINIC | Age: 79
End: 2024-09-23
Payer: MEDICARE

## 2024-09-23 DIAGNOSIS — Z12.5 PROSTATE CANCER SCREENING: Primary | ICD-10-CM

## 2024-09-23 DIAGNOSIS — I11.9 HYPERTENSIVE HEART DISEASE, UNSPECIFIED WHETHER HEART FAILURE PRESENT: ICD-10-CM

## 2024-09-23 DIAGNOSIS — E11.22 TYPE 2 DIABETES MELLITUS WITH STAGE 3A CHRONIC KIDNEY DISEASE, WITHOUT LONG-TERM CURRENT USE OF INSULIN (MULTI): ICD-10-CM

## 2024-09-23 DIAGNOSIS — N18.31 STAGE 3A CHRONIC KIDNEY DISEASE (MULTI): ICD-10-CM

## 2024-09-23 DIAGNOSIS — N18.31 TYPE 2 DIABETES MELLITUS WITH STAGE 3A CHRONIC KIDNEY DISEASE, WITHOUT LONG-TERM CURRENT USE OF INSULIN (MULTI): ICD-10-CM

## 2024-10-11 ENCOUNTER — APPOINTMENT (OUTPATIENT)
Dept: PRIMARY CARE | Facility: CLINIC | Age: 79
End: 2024-10-11
Payer: MEDICARE

## 2024-10-11 VITALS
HEIGHT: 64 IN | OXYGEN SATURATION: 96 % | TEMPERATURE: 97.9 F | DIASTOLIC BLOOD PRESSURE: 64 MMHG | BODY MASS INDEX: 34.57 KG/M2 | HEART RATE: 64 BPM | SYSTOLIC BLOOD PRESSURE: 134 MMHG | WEIGHT: 202.5 LBS | RESPIRATION RATE: 18 BRPM

## 2024-10-11 DIAGNOSIS — E11.22 TYPE 2 DIABETES MELLITUS WITH STAGE 3A CHRONIC KIDNEY DISEASE, WITHOUT LONG-TERM CURRENT USE OF INSULIN (MULTI): ICD-10-CM

## 2024-10-11 DIAGNOSIS — Z00.00 ROUTINE GENERAL MEDICAL EXAMINATION AT HEALTH CARE FACILITY: Primary | ICD-10-CM

## 2024-10-11 DIAGNOSIS — N18.31 TYPE 2 DIABETES MELLITUS WITH STAGE 3A CHRONIC KIDNEY DISEASE, WITHOUT LONG-TERM CURRENT USE OF INSULIN (MULTI): ICD-10-CM

## 2024-10-11 DIAGNOSIS — C67.9 MALIGNANT NEOPLASM OF URINARY BLADDER, UNSPECIFIED SITE (MULTI): ICD-10-CM

## 2024-10-11 SDOH — ECONOMIC STABILITY: FOOD INSECURITY: WITHIN THE PAST 12 MONTHS, THE FOOD YOU BOUGHT JUST DIDN'T LAST AND YOU DIDN'T HAVE MONEY TO GET MORE.: NEVER TRUE

## 2024-10-11 SDOH — ECONOMIC STABILITY: FOOD INSECURITY: WITHIN THE PAST 12 MONTHS, YOU WORRIED THAT YOUR FOOD WOULD RUN OUT BEFORE YOU GOT MONEY TO BUY MORE.: NEVER TRUE

## 2024-10-11 SDOH — ECONOMIC STABILITY: INCOME INSECURITY: IN THE LAST 12 MONTHS, WAS THERE A TIME WHEN YOU WERE NOT ABLE TO PAY THE MORTGAGE OR RENT ON TIME?: NO

## 2024-10-11 SDOH — HEALTH STABILITY: PHYSICAL HEALTH: ON AVERAGE, HOW MANY DAYS PER WEEK DO YOU ENGAGE IN MODERATE TO STRENUOUS EXERCISE (LIKE A BRISK WALK)?: 4 DAYS

## 2024-10-11 SDOH — HEALTH STABILITY: PHYSICAL HEALTH: ON AVERAGE, HOW MANY MINUTES DO YOU ENGAGE IN EXERCISE AT THIS LEVEL?: 30 MIN

## 2024-10-11 SDOH — ECONOMIC STABILITY: GENERAL
WHICH OF THE FOLLOWING DO YOU KNOW HOW TO USE AND HAVE ACCESS TO EVERY DAY? (CHOOSE ALL THAT APPLY): SMARTPHONE WITH CELLULAR DATA PLAN

## 2024-10-11 ASSESSMENT — ENCOUNTER SYMPTOMS
LOSS OF SENSATION IN FEET: 0
OCCASIONAL FEELINGS OF UNSTEADINESS: 0
DEPRESSION: 0

## 2024-10-11 ASSESSMENT — SOCIAL DETERMINANTS OF HEALTH (SDOH)
WITHIN THE LAST YEAR, HAVE YOU BEEN KICKED, HIT, SLAPPED, OR OTHERWISE PHYSICALLY HURT BY YOUR PARTNER OR EX-PARTNER?: NO
HOW OFTEN DO YOU ATTENT MEETINGS OF THE CLUB OR ORGANIZATION YOU BELONG TO?: NEVER
HOW OFTEN DO YOU GET TOGETHER WITH FRIENDS OR RELATIVES?: NEVER
HOW HARD IS IT FOR YOU TO PAY FOR THE VERY BASICS LIKE FOOD, HOUSING, MEDICAL CARE, AND HEATING?: NOT HARD AT ALL
DO YOU BELONG TO ANY CLUBS OR ORGANIZATIONS SUCH AS CHURCH GROUPS UNIONS, FRATERNAL OR ATHLETIC GROUPS, OR SCHOOL GROUPS?: NO
WITHIN THE LAST YEAR, HAVE YOU BEEN AFRAID OF YOUR PARTNER OR EX-PARTNER?: NO
WITHIN THE LAST YEAR, HAVE YOU BEEN HUMILIATED OR EMOTIONALLY ABUSED IN OTHER WAYS BY YOUR PARTNER OR EX-PARTNER?: NO
IN THE PAST 12 MONTHS, HAS THE ELECTRIC, GAS, OIL, OR WATER COMPANY THREATENED TO SHUT OFF SERVICE IN YOUR HOME?: NO
IN A TYPICAL WEEK, HOW MANY TIMES DO YOU TALK ON THE PHONE WITH FAMILY, FRIENDS, OR NEIGHBORS?: ONCE A WEEK
WITHIN THE LAST YEAR, HAVE TO BEEN RAPED OR FORCED TO HAVE ANY KIND OF SEXUAL ACTIVITY BY YOUR PARTNER OR EX-PARTNER?: NO
HOW OFTEN DO YOU ATTEND CHURCH OR RELIGIOUS SERVICES?: NEVER

## 2024-10-11 ASSESSMENT — ACTIVITIES OF DAILY LIVING (ADL)
DRESSING: INDEPENDENT
DOING_HOUSEWORK: INDEPENDENT
GROCERY_SHOPPING: INDEPENDENT
TAKING_MEDICATION: INDEPENDENT
BATHING: INDEPENDENT
MANAGING_FINANCES: INDEPENDENT

## 2024-10-11 ASSESSMENT — PATIENT HEALTH QUESTIONNAIRE - PHQ9
1. LITTLE INTEREST OR PLEASURE IN DOING THINGS: NOT AT ALL
SUM OF ALL RESPONSES TO PHQ9 QUESTIONS 1 AND 2: 0
2. FEELING DOWN, DEPRESSED OR HOPELESS: NOT AT ALL

## 2024-10-11 ASSESSMENT — LIFESTYLE VARIABLES
HOW OFTEN DO YOU HAVE A DRINK CONTAINING ALCOHOL: NEVER
HOW MANY STANDARD DRINKS CONTAINING ALCOHOL DO YOU HAVE ON A TYPICAL DAY: PATIENT DOES NOT DRINK

## 2024-10-11 NOTE — PROGRESS NOTES
Subjective   Mario Alberto Segal is a 78 y.o. male who is here for a routine exam/Medicare Annual    Active Problem List      Comprehensive Medical/Surgical/Social/Family History  Past Medical History:   Diagnosis Date    Bladder calculi 10/12/2023    Impacted cerumen, right ear 09/05/2019    Impacted cerumen of right ear    Other specified disorders of kidney and ureter 02/11/2022    Renal mass    Personal history of nicotine dependence 09/20/2021    History of nicotine dependence    Personal history of other diseases of urinary system 02/11/2022    History of hematuria    Personal history of urinary (tract) infections 12/20/2021    History of hemorrhagic cystitis    Unspecified symptoms and signs involving the genitourinary system     UTI symptoms     Past Surgical History:   Procedure Laterality Date    OTHER SURGICAL HISTORY  04/07/2022    Transurethral resection of bladder tumor     Social History     Social History Narrative    Not on file       Allergies and Medications  Patient has no known allergies.  Current Outpatient Medications on File Prior to Visit   Medication Sig Dispense Refill    ASPIRIN ORAL Take 1 tablet by mouth in the morning. Aspirin 81 MG TABS      carvedilol (Coreg) 6.25 mg tablet Take 1 tablet (6.25 mg) by mouth 2 times a day. 180 tablet 3    cholecalciferol (Vitamin D-3) 50 MCG (2000 UT) tablet Take 1 tablet (2,000 Units) by mouth once daily.      cyanocobalamin, vitamin B-12, (VITAMIN B-12 ORAL) Take 1 tablet by mouth once daily.      emollient combination no.97 (Aquaphilic) ointment Apply topically. Use as directed - As needed.      ezetimibe-simvastatin (Vytorin) 10-40 mg tablet TAKE 1 TABLET EVERY EVENING 90 tablet 1    ferrous sulfate 325 (65 Fe) MG tablet Take 1 tablet (325 mg) by mouth once daily.      folic acid/multivit-min/lutein (CENTRUM SILVER ORAL)       furosemide (Lasix) 20 mg tablet TAKE 1 TABLET BY MOUTH EVERY DAY 90 tablet 1    hydrALAZINE (Apresoline) 100 mg tablet Take  "one tablet three times a day 90 tablet 11    losartan (Cozaar) 100 mg tablet Take 1 tablet (100 mg) by mouth once daily. 100 tablet 3    metFORMIN (Glucophage) 1,000 mg tablet TAKE 1 TABLET BY MOUTH EVERY 12 HOURS 180 tablet 3    nystatin (Mycostatin) 100,000 unit/gram powder Apply topically.  APPLY 2-3 TIMES DAILY TO AFFECTED AREA(S).      OneTouch Ultra Test strip 1 strip daily      pen needle, diabetic (BD Geraldine 2nd Gen Pen Needle) 32 gauge x 5/32\" needle USE 1 DAILY 200 each 3    [DISCONTINUED] semaglutide (OZEMPIC) 1 mg/dose (4 mg/3 mL) pen injector Inject 1 mg under the skin 1 (one) time per week. 9 mL 0     No current facility-administered medications on file prior to visit.       Concerns today:  No additional concerns today.  Patient continues to follow-up with nephrology, cardiology.  Still significantly hypertensive, repeat blood pressure was within normal limits.  Patient recently had hydralazine increased to 100 mg 3 times daily.    Patient significant improvement in swelling of the lower extremities    Lifestyle  Diet: Healthy and Well Balanced  Physical Activity: Insufficiently Active (10/11/2024)    Exercise Vital Sign     Days of Exercise per Week: 4 days     Minutes of Exercise per Session: 30 min      Tobacco Use: Medium Risk (10/11/2024)    Patient History     Smoking Tobacco Use: Former     Smokeless Tobacco Use: Never     Passive Exposure: Not on file      Alcohol Use: Not At Risk (10/11/2024)    AUDIT-C     Frequency of Alcohol Consumption: Never     Average Number of Drinks: Patient does not drink     Frequency of Binge Drinking: Not on file      Depression: Not at risk (10/11/2024)    PHQ-2     PHQ-2 Score: 0      Stress: No Stress Concern Present (10/11/2024)    [employer] - Occupational Stress Questionnaire     Feeling of Stress : Not at all         Consultants:  Patient Care Team:  Itz Talbert DO as PCP - General  Itz Talbert DO as PCP - [employer] " "Advantage PCP  Chadwick May MD as Consulting Physician (Cardiology)  Malia Newberry MD PhD as Consulting Physician (Nephrology)   Previously saw urology for bladder CA - no follow up necessary    Colorectal Screening:   Not indicated due to age    Nocturia: .Present  Erectile dysfunction: .Did not discuss    Review of Systems    Objective   /64 (BP Location: Right arm, Patient Position: Sitting)   Pulse 64   Temp 36.6 °C (97.9 °F) (Temporal)   Resp 18   Ht 1.626 m (5' 4\")   Wt 91.9 kg (202 lb 8 oz)   SpO2 96%   BMI 34.76 kg/m²     Physical Exam    Assessment/Plan   Problem List Items Addressed This Visit       Type 2 diabetes mellitus with stage 3a chronic kidney disease, without long-term current use of insulin (Multi)    Relevant Medications    semaglutide (OZEMPIC) 1 mg/dose (4 mg/3 mL) pen injector (Start on 10/13/2024)    Malignant neoplasm of urinary bladder (Multi)    Relevant Orders    Disability Placard     Reviewed Social Determinants of health with patient, discussed healthy lifestyle including 150 minutes of physical activity per week  Ordered/Reviewed baseline labwork -CBC, CMP, Lipid Panel  Immunizations: Up To Date  Colonoscopy Not indicated due to age    Lab Results   Component Value Date    HGBA1C 6.8 (A) 04/15/2024     Continue treatment including: GLP-1/terzepatide and SGLT-2  Complications include: CAD  Continue to monitor blood sugars  Continue with follow ups including:   Ophthalmology: Up-to-date  Podiatry: Referral placed  Cardiology: Up-to-date  Discussed healthy, low carb diet and trying to get 150 minutes of physical activity per week.  Follow up in 3 months or sooner if new concerns arise          Health Counseling    Advance Directives Discussion  16 - 20 minutes were spent discussing Advanced Care Planning (including a Living Will, Medical Power Of , as well as specific end of life choices and/or directives). The details of that discussion were documented in " Advanced Directives Discussion section of the medical record.     Cardiac Risk Assessment  15 - 20 minutes were spent discussing Cardiovascular risk and, if needed, lifestyle modifications were recommended, including nutritional choices, exercise, and elimination of habits contributing to risk.   Aspirin use/disuse was discussed following the guidelines below:  low dose ASA ( mg) should be considered:    If prior Heart Attack/Stroke/Peripheral vascular disease:  Generally recommend daily low dose aspirin unless extremely high bleeding risk (e.g., gastrointestinal).    If no prior Heart Attack/Stroke/Peripheral vascular disease:              Age over 70: Do not use Aspirin for prevention    Age less than 70 and 10-year cardiovascular disease risk is >20%: use low dose Aspirin for prevention.

## 2024-10-14 ENCOUNTER — HOSPITAL ENCOUNTER (OUTPATIENT)
Dept: CARDIOLOGY | Facility: HOSPITAL | Age: 79
Discharge: HOME | End: 2024-10-14
Payer: MEDICARE

## 2024-10-14 DIAGNOSIS — R60.0 BILATERAL LEG EDEMA: ICD-10-CM

## 2024-10-14 PROCEDURE — 93970 EXTREMITY STUDY: CPT

## 2024-10-14 PROCEDURE — 93970 EXTREMITY STUDY: CPT | Performed by: SURGERY

## 2024-10-24 ENCOUNTER — APPOINTMENT (OUTPATIENT)
Dept: CARDIOLOGY | Facility: CLINIC | Age: 79
End: 2024-10-24
Payer: MEDICARE

## 2024-10-24 VITALS
HEART RATE: 68 BPM | BODY MASS INDEX: 33.97 KG/M2 | DIASTOLIC BLOOD PRESSURE: 58 MMHG | SYSTOLIC BLOOD PRESSURE: 144 MMHG | HEIGHT: 64 IN | WEIGHT: 199 LBS

## 2024-10-24 DIAGNOSIS — I87.2 CHRONIC VENOUS INSUFFICIENCY: Primary | ICD-10-CM

## 2024-10-24 DIAGNOSIS — I89.0 LYMPHEDEMA: ICD-10-CM

## 2024-10-24 DIAGNOSIS — R60.0 BILATERAL LEG EDEMA: ICD-10-CM

## 2024-10-24 DIAGNOSIS — I35.8 AORTIC VALVE SCLEROSIS: ICD-10-CM

## 2024-10-24 DIAGNOSIS — I11.9 HYPERTENSIVE HEART DISEASE WITHOUT HEART FAILURE: ICD-10-CM

## 2024-10-24 DIAGNOSIS — E78.49 OTHER HYPERLIPIDEMIA: ICD-10-CM

## 2024-10-24 PROCEDURE — 3077F SYST BP >= 140 MM HG: CPT | Performed by: INTERNAL MEDICINE

## 2024-10-24 PROCEDURE — 1159F MED LIST DOCD IN RCRD: CPT | Performed by: INTERNAL MEDICINE

## 2024-10-24 PROCEDURE — 99215 OFFICE O/P EST HI 40 MIN: CPT | Performed by: INTERNAL MEDICINE

## 2024-10-24 PROCEDURE — 3078F DIAST BP <80 MM HG: CPT | Performed by: INTERNAL MEDICINE

## 2024-10-24 PROCEDURE — 1036F TOBACCO NON-USER: CPT | Performed by: INTERNAL MEDICINE

## 2024-10-24 PROCEDURE — G2211 COMPLEX E/M VISIT ADD ON: HCPCS | Performed by: INTERNAL MEDICINE

## 2024-10-24 NOTE — PROGRESS NOTES
"Chief Complaint:   Please see below.     History Of Present Illness:    Mario Alberto Segal is a 78 y.o. male presenting with hypertensive heart disease, chronic venous insufficiency, lymphedema.    This 78 year old man has HTN with hypertensive heart disease, DM, and hyperlipidemia. He had no critical coronary stenosis on cath in 2014.. He has normal LV function, with aortic sclerosis on his echo in 9/2022.    His primary care physician started him on furosemide for lower extremity edema. He developed rising creatinine and was referred to nephrology. His furosemide dose was increased, amlodipine stopped, pioglitazone stopped, and hydralazine was increased. With these changes, his lower extremity edema has persisted.     At the previous visit, I had increased his hydralazine.  At home, his blood pressures are well-controlled.  His blood pressure is slightly elevated in the office today.   Last Recorded Vitals:  Vitals:    10/24/24 1300   BP: 144/58   BP Location: Left arm   Patient Position: Sitting   Pulse: 68   Weight: 90.3 kg (199 lb)   Height: 1.626 m (5' 4\")       Past Medical History:  He has a past medical history of Bladder calculi (10/12/2023), Impacted cerumen, right ear (09/05/2019), Other specified disorders of kidney and ureter (02/11/2022), Personal history of nicotine dependence (09/20/2021), Personal history of other diseases of urinary system (02/11/2022), Personal history of urinary (tract) infections (12/20/2021), and Unspecified symptoms and signs involving the genitourinary system.    Past Surgical History:  He has a past surgical history that includes Other surgical history (04/07/2022).      Social History:  He reports that he quit smoking about 2 years ago. His smoking use included cigars. He has never used smokeless tobacco. He reports that he does not currently use alcohol. He reports that he does not use drugs.    Family History:  Family History   Problem Relation Name Age of Onset    " "Diabetes Mother      Hypertension Maternal Grandfather          Allergies:  Patient has no known allergies.    Outpatient Medications:  Current Outpatient Medications   Medication Instructions    ASPIRIN ORAL 1 tablet, Daily    carvedilol (COREG) 6.25 mg, oral, 2 times daily    cholecalciferol (Vitamin D-3) 50 MCG (2000 UT) tablet 1 tablet, Daily    cyanocobalamin, vitamin B-12, (VITAMIN B-12 ORAL) 1 tablet, Daily    emollient combination no.97 (Aquaphilic) ointment Apply topically. Use as directed - As needed.    ezetimibe-simvastatin (Vytorin) 10-40 mg tablet 1 tablet, oral, Every evening    ferrous sulfate 325 (65 Fe) MG tablet 1 tablet, Daily    folic acid/multivit-min/lutein (CENTRUM SILVER ORAL)     furosemide (LASIX) 20 mg, oral, Daily    hydrALAZINE (Apresoline) 100 mg tablet Take one tablet three times a day    losartan (COZAAR) 100 mg, oral, Daily    metFORMIN (Glucophage) 1,000 mg tablet TAKE 1 TABLET BY MOUTH EVERY 12 HOURS    nystatin (Mycostatin) 100,000 unit/gram powder Apply topically.  APPLY 2-3 TIMES DAILY TO AFFECTED AREA(S).    OneTouch Ultra Test strip 1 strip daily    pen needle, diabetic (BD Geraldine 2nd Gen Pen Needle) 32 gauge x 5/32\" needle USE 1 DAILY    semaglutide (OZEMPIC) 1 mg, subcutaneous, Once Weekly       Physical Exam:  GENERAL:  pleasant 78 year-old  HEENT: No xanthelasma  NECK: Supple, no palpable adenopathy or thyromegaly  CHEST: Clear to auscultation, respiratory effort unlabored  CARDIAC: RRR, normal S1 and S2, no audible  rub, gallop, carotids are brisk, PMI is not displaced; there is a grade 1 systolic murmur heard best at the RSB.  ABD: Active bowel sounds, nontender, no organomegaly, no evidence of ascites  EXT: No clubbing, cyanosis,  or tenderness; there is bilateral lower extremity lymphedema.  NEURO: Awake, alert, appropriate, speech is fluent       Last Labs:  CBC -  Lab Results   Component Value Date    WBC 6.5 08/09/2022    HGB 12.1 (L) 08/09/2022    HCT 37.8 (L) " "08/09/2022    MCV 93 08/09/2022     (L) 08/09/2022       CMP -  Lab Results   Component Value Date    CALCIUM 9.7 08/09/2022       LIPID PANEL -   No results found for: \"CHOL\", \"TRIG\", \"HDL\", \"CHHDL\", \"LDLF\", \"VLDL\", \"NHDL\"    RENAL FUNCTION PANEL -   Lab Results   Component Value Date    GLUCOSE 134 (H) 08/09/2022     08/09/2022    K 4.5 08/09/2022     08/09/2022    CO2 26 08/09/2022    ANIONGAP 13 08/09/2022    BUN 22 08/09/2022    CREATININE 1.18 10/19/2023    GFRMALE 81 08/09/2022    CALCIUM 9.7 08/09/2022        Lab Results   Component Value Date    HGBA1C 6.8 (A) 04/15/2024         Diagnostic review: I have personally reviewed the result(s) of the lower extremity venous insufficiency reflux study.  Please see the report for complete details    Assessment/Plan   Assessment & Plan  Bilateral leg edema    Orders:    Follow Up In Cardiology    Hypertensive heart disease without heart failure  Risk factor modification: educational materials were provided to the patient.     Orders:    Follow Up In Cardiology    Follow Up In Cardiology; Future    Other hyperlipidemia    Orders:    Follow Up In Cardiology    Follow Up In Cardiology; Future    Aortic valve sclerosis    Orders:    Follow Up In Cardiology    Follow Up In Cardiology; Future    BMI 35.0-35.9,adult    Orders:    Follow Up In Cardiology    Chronic venous insufficiency    Orders:    Referral to Vascular Medicine; Future    Follow Up In Cardiology; Future    Lymphedema    Orders:    Referral to Vascular Medicine; Future          Chadwick May MD  "

## 2024-10-24 NOTE — ASSESSMENT & PLAN NOTE
Risk factor modification: educational materials were provided to the patient.     Orders:    Follow Up In Cardiology    Follow Up In Cardiology; Future

## 2024-11-23 DIAGNOSIS — I11.9 HYPERTENSIVE HEART DISEASE, UNSPECIFIED WHETHER HEART FAILURE PRESENT: ICD-10-CM

## 2024-11-26 ENCOUNTER — TELEPHONE (OUTPATIENT)
Dept: PRIMARY CARE | Facility: CLINIC | Age: 79
End: 2024-11-26
Payer: MEDICARE

## 2024-11-26 DIAGNOSIS — I11.9 HYPERTENSIVE HEART DISEASE, UNSPECIFIED WHETHER HEART FAILURE PRESENT: ICD-10-CM

## 2024-11-26 RX ORDER — EZETIMIBE AND SIMVASTATIN 10; 40 MG/1; MG/1
1 TABLET ORAL EVERY EVENING
Qty: 90 TABLET | Refills: 1 | OUTPATIENT
Start: 2024-11-26

## 2024-11-26 RX ORDER — EZETIMIBE AND SIMVASTATIN 10; 40 MG/1; MG/1
1 TABLET ORAL DAILY
Qty: 90 TABLET | Refills: 1 | Status: SHIPPED | OUTPATIENT
Start: 2024-11-26

## 2024-11-26 NOTE — TELEPHONE ENCOUNTER
Rx Refill Request Telephone Encounter    Name:  Mario Alberto Segal  :  [id number]  Medication Name:            ezetimibe-simvastatin (Vytorin) 10-40 mg tablet        Sig: TAKE 1 TABLET EVERY EVENING          Specific Pharmacy location:  [facility]/pharmacy #[id number] [address] AT [address] [address]  Phone: [phone]  Fax: [phone]

## 2024-11-28 DIAGNOSIS — I89.0 LYMPHEDEMA: ICD-10-CM

## 2024-12-01 RX ORDER — FUROSEMIDE 20 MG/1
20 TABLET ORAL DAILY
Qty: 90 TABLET | Refills: 1 | Status: SHIPPED | OUTPATIENT
Start: 2024-12-01

## 2024-12-18 ENCOUNTER — OFFICE VISIT (OUTPATIENT)
Dept: VASCULAR SURGERY | Facility: CLINIC | Age: 79
End: 2024-12-18
Payer: MEDICARE

## 2024-12-18 ENCOUNTER — APPOINTMENT (OUTPATIENT)
Dept: VASCULAR SURGERY | Facility: CLINIC | Age: 79
End: 2024-12-18
Payer: MEDICARE

## 2024-12-18 VITALS
BODY MASS INDEX: 34.15 KG/M2 | SYSTOLIC BLOOD PRESSURE: 140 MMHG | HEIGHT: 64 IN | HEART RATE: 79 BPM | DIASTOLIC BLOOD PRESSURE: 90 MMHG | OXYGEN SATURATION: 98 % | WEIGHT: 200 LBS

## 2024-12-18 DIAGNOSIS — I89.0 LYMPHEDEMA OF BOTH LOWER EXTREMITIES: Primary | ICD-10-CM

## 2024-12-18 PROCEDURE — 3077F SYST BP >= 140 MM HG: CPT | Performed by: NURSE PRACTITIONER

## 2024-12-18 PROCEDURE — 99214 OFFICE O/P EST MOD 30 MIN: CPT | Performed by: NURSE PRACTITIONER

## 2024-12-18 PROCEDURE — 3080F DIAST BP >= 90 MM HG: CPT | Performed by: NURSE PRACTITIONER

## 2024-12-18 PROCEDURE — 1160F RVW MEDS BY RX/DR IN RCRD: CPT | Performed by: NURSE PRACTITIONER

## 2024-12-18 PROCEDURE — 1159F MED LIST DOCD IN RCRD: CPT | Performed by: NURSE PRACTITIONER

## 2024-12-18 NOTE — PROGRESS NOTES
"Subjective   Mario Alberto Segal is a 79 y.o. male.    Chief Complaint:  80yo patient referred by Cardiologist, Dr. May,  for lymphedema and varicose veins.     HPI  80yo patient referred by Cardiologist, Dr. May for lymphedema and varicose veins.     PMHx; HTN, HLD, Obesity, Lymphedema, CKD, Bladder CA  PSHx: Transurethral Resection of Bladder Tumor  Social Hx: Former smoker, quit 2022.     Patient presents unaccompanied today. Reports BLE edema x 6 months. Reports discoloration and rash x 1 year. Evaluated by PCP & Nephrology, treated with Lasix with some improvement. Discontinued Actos & Amlodipine. Denies BLE pain. Endorses increased pruritus. Denies BLE paresthesias. Not following with podiatry or wound care. Wrapping BLE w/Ace bandages for compression and applying OTC aquaphor. Right testicle is enlarged x 1 week. Medication adherent.     Review of Systems   Cardiovascular:  Positive for leg swelling.   Skin:  Positive for color change, itching and rash. Negative for poor wound healing.   Neurological:  Negative for numbness and paresthesias.     Objective   Visit Vitals  /90 (BP Location: Left arm, Patient Position: Sitting)   Pulse 79   Ht 1.626 m (5' 4\")   Wt 90.7 kg (200 lb)   SpO2 98%   BMI 34.33 kg/m²   Smoking Status Former   BSA 2.02 m²       Vitals reviewed.   Constitutional:       Appearance: Obese.   Neck:      Vascular: No carotid bruit, hepatojugular reflux or JVD. JVD normal.   Pulmonary:      Effort: Pulmonary effort is normal.      Breath sounds: Normal breath sounds.   Cardiovascular:      Normal rate. Regular rhythm.      Murmurs: There is no murmur.      No gallop.  No click. No rub.   Edema:     Peripheral edema present.     Ankle: bilateral edema of the ankle.     Feet: bilateral edema of the feet.  Skin:     General: Skin is warm and dry.   Neurological:      Mental Status: Alert and oriented to person, place and time.       Lab Review:   Lab Results   Component Value " "Date     08/09/2022    K 4.5 08/09/2022     08/09/2022    CO2 26 08/09/2022    BUN 22 08/09/2022    CREATININE 1.18 10/19/2023    GLUCOSE 134 (H) 08/09/2022    CALCIUM 9.7 08/09/2022     Lab Results   Component Value Date    WBC 6.5 08/09/2022    HGB 12.1 (L) 08/09/2022    HCT 37.8 (L) 08/09/2022    MCV 93 08/09/2022     (L) 08/09/2022     No results found for: \"CHOL\", \"TRIG\", \"HDL\", \"LDLDIRECT\"    Diagnostic Imaging:   Vascular US LE Venous Insufficiency Bilateral 10/14/24  CONCLUSIONS:  Right Lower Venous Insufficiency: No evidence of superficial venous reflux.  Left Lower Venous Insufficiency: Reflux is noted in the saphenofemoral junction vein.  Right Lower Venous: Additional Findings; Lymph nodes Multiple lymph nodes noted in right groin. Largest measured .96 cm by 1.42 cm.  Left Lower Venous: Additional Findings; Lymph nodes Multiple lymph nodes noted in left groin. Largest measured .71 cm by 1.44 cm.     Imaging & Doppler Findings:     Right          Compress Thrombus   Time  SFJ              Yes      None  Prox Thigh GSV   Yes      None  Mid Thigh GSV    Yes      None  Knee GSV         Yes      None  Prox Calf GSV    Yes      None  Mid Calf GSV     Yes      None  Dist Calf GSV    Yes      None  SPJ              Yes      None  SSV Prox         Yes      None  SSV Mid          Yes      None  SSV Distal       Yes      None  Common FV                        2.70 sec        Left           Compress Thrombus  Diam   Depth    Time  SFJ              Yes      None   7.7 mm 23.5 mm 2.10 sec  Prox Thigh GSV   Yes      None  Mid Thigh GSV    Yes      None  Knee GSV         Yes      None  Prox Calf GSV    Yes      None  Mid Calf GSV     Yes      None  Dist Calf GSV    Yes      None  SPJ              Yes      None  SSV Prox         Yes      None  SSV Mid          Yes      None  SSV Distal       Yes      None        Right                 Compressible Thrombus        Flow  Distal External Iliac              "   None  CFV                       Yes        None       Pulsatile  PFV                       Yes        None  FV Proximal               Yes        None  FV Mid                    Yes        None   Spontaneous/Phasic  FV Distal                 Yes        None  Popliteal                 Yes        None   Spontaneous/Phasic  Peroneal                  Yes        None  PTV                       Yes        None        Left                  Compress Thrombus        Flow  Distal External Iliac            None  CFV                     Yes      None   Spontaneous/Phasic  PFV                     Yes      None  FV Proximal             Yes      None  FV Mid                  Yes      None   Spontaneous/Phasic  FV Distal               Yes      None  Popliteal               Yes      None   Spontaneous/Phasic  Peroneal                Yes      None  PTV                     Yes      None        [id number] Diomedes Willett MD  Electronically signed by [id number] Diomedes Willett MD on 10/15/2024 at 12:49:1PM    Current Outpatient Medications:     ASPIRIN ORAL, Take 1 tablet by mouth in the morning. Aspirin 81 MG TABS (Patient taking differently: Take 1 tablet (81 mg) by mouth once daily.), Disp: , Rfl:     carvedilol (Coreg) 6.25 mg tablet, Take 1 tablet (6.25 mg) by mouth 2 times a day., Disp: 180 tablet, Rfl: 3    cholecalciferol (Vitamin D-3) 50 MCG (2000 UT) tablet, Take 1 tablet (2,000 Units) by mouth once daily., Disp: , Rfl:     cyanocobalamin, vitamin B-12, (VITAMIN B-12 ORAL), Take 1 tablet by mouth once daily., Disp: , Rfl:     emollient combination no.97 (Aquaphilic) ointment, Apply topically. Use as directed - As needed., Disp: , Rfl:     ezetimibe-simvastatin (Vytorin) 10-40 mg tablet, TAKE 1 TABLET BY MOUTH EVERY DAY, Disp: 90 tablet, Rfl: 1    ferrous sulfate 325 (65 Fe) MG tablet, Take 1 tablet (325 mg) by mouth once daily., Disp: , Rfl:     folic acid/multivit-min/lutein (CENTRUM SILVER ORAL), , Disp: , Rfl:     furosemide  "(Lasix) 20 mg tablet, TAKE 1 TABLET BY MOUTH EVERY DAY, Disp: 90 tablet, Rfl: 1    hydrALAZINE (Apresoline) 100 mg tablet, Take one tablet three times a day, Disp: 90 tablet, Rfl: 11    losartan (Cozaar) 100 mg tablet, Take 1 tablet (100 mg) by mouth once daily., Disp: 100 tablet, Rfl: 3    metFORMIN (Glucophage) 1,000 mg tablet, TAKE 1 TABLET BY MOUTH EVERY 12 HOURS, Disp: 180 tablet, Rfl: 3    nystatin (Mycostatin) 100,000 unit/gram powder, Apply topically.  APPLY 2-3 TIMES DAILY TO AFFECTED AREA(S)., Disp: , Rfl:     OneTouch Ultra Test strip, 1 strip daily, Disp: , Rfl:     pen needle, diabetic (BD Geraldine 2nd Gen Pen Needle) 32 gauge x 5/32\" needle, USE 1 DAILY, Disp: 200 each, Rfl: 3    semaglutide (OZEMPIC) 1 mg/dose (4 mg/3 mL) pen injector, Inject 1 mg under the skin 1 (one) time per week., Disp: 9 mL, Rfl: 3    Assessment/Plan   Chronic Venous Insufficiency/Lymphedema  Reports BLE edema x 6 months. Reports discoloration and rash x 1 year. Evaluated by PCP & Nephrology, treated with Lasix with some improvement. Discontinued Actos & Amlodipine. Denies BLE pain. Endorses increased pruritus. Denies BLE paresthesias. Wrapping BLE w/Ace bandages for compression and applying OTC aquaphor. Right testicle is enlarged x 1 week. Medication adherent.   Vascular US LE Venous Insufficiency Bilateral 10/14/24 Right Lower Venous Insufficiency: No evidence of superficial venous reflux. Left Lower Venous Insufficiency: Reflux is noted in the saphenofemoral junction vein. Multiple lymph nodes noted in bilateral groins.   Previously discontinued Amlodipine and pioglitazone with no improvement.   TTE w/LVEF 50-55% and no LV diastolic dysfunction. Started on Furosemide w/o improvement. Order for BNP. Consider SGLT2i and/or MRA.   BMI 34. Currently on GLP-1 x 2 months.   Encourage compression, elevation, and walking as tolerated.  Referral to Lymphedema PT.   "

## 2024-12-18 NOTE — PATIENT INSTRUCTIONS
Continue compression, elevation, and walking as tolerated.   You have been referred to physical therapy lymphedema clinic.   Schedule follow up in 3 months.

## 2024-12-19 ENCOUNTER — APPOINTMENT (OUTPATIENT)
Dept: RADIOLOGY | Facility: HOSPITAL | Age: 79
End: 2024-12-19
Payer: MEDICARE

## 2024-12-19 ENCOUNTER — TELEPHONE (OUTPATIENT)
Dept: CARDIOLOGY | Facility: CLINIC | Age: 79
End: 2024-12-19
Payer: MEDICARE

## 2024-12-19 ENCOUNTER — TELEPHONE (OUTPATIENT)
Dept: PRIMARY CARE | Facility: CLINIC | Age: 79
End: 2024-12-19
Payer: MEDICARE

## 2024-12-19 ENCOUNTER — HOSPITAL ENCOUNTER (EMERGENCY)
Facility: HOSPITAL | Age: 79
Discharge: HOME | End: 2024-12-19
Payer: MEDICARE

## 2024-12-19 ENCOUNTER — APPOINTMENT (OUTPATIENT)
Dept: CARDIOLOGY | Facility: HOSPITAL | Age: 79
End: 2024-12-19
Payer: MEDICARE

## 2024-12-19 VITALS
BODY MASS INDEX: 34.15 KG/M2 | HEART RATE: 80 BPM | DIASTOLIC BLOOD PRESSURE: 140 MMHG | RESPIRATION RATE: 16 BRPM | WEIGHT: 200 LBS | OXYGEN SATURATION: 98 % | SYSTOLIC BLOOD PRESSURE: 182 MMHG | HEIGHT: 64 IN

## 2024-12-19 DIAGNOSIS — M25.572 ACUTE LEFT ANKLE PAIN: Primary | ICD-10-CM

## 2024-12-19 LAB
ALBUMIN SERPL BCP-MCNC: 3.7 G/DL (ref 3.4–5)
ALP SERPL-CCNC: 56 U/L (ref 33–136)
ALT SERPL W P-5'-P-CCNC: 7 U/L (ref 10–52)
ANION GAP SERPL CALC-SCNC: 11 MMOL/L (ref 10–20)
AST SERPL W P-5'-P-CCNC: 12 U/L (ref 9–39)
ATRIAL RATE: 84 BPM
BASOPHILS # BLD AUTO: 0.01 X10*3/UL (ref 0–0.1)
BASOPHILS NFR BLD AUTO: 0.1 %
BILIRUB SERPL-MCNC: 1 MG/DL (ref 0–1.2)
BNP SERPL-MCNC: 199 PG/ML (ref 0–99)
BUN SERPL-MCNC: 15 MG/DL (ref 6–23)
CALCIUM SERPL-MCNC: 9 MG/DL (ref 8.6–10.3)
CARDIAC TROPONIN I PNL SERPL HS: 18 NG/L (ref 0–20)
CHLORIDE SERPL-SCNC: 103 MMOL/L (ref 98–107)
CO2 SERPL-SCNC: 28 MMOL/L (ref 21–32)
CREAT SERPL-MCNC: 1.01 MG/DL (ref 0.5–1.3)
CRP SERPL-MCNC: 1.21 MG/DL
EGFRCR SERPLBLD CKD-EPI 2021: 76 ML/MIN/1.73M*2
EOSINOPHIL # BLD AUTO: 0.09 X10*3/UL (ref 0–0.4)
EOSINOPHIL NFR BLD AUTO: 0.8 %
ERYTHROCYTE [DISTWIDTH] IN BLOOD BY AUTOMATED COUNT: 15.9 % (ref 11.5–14.5)
GLUCOSE SERPL-MCNC: 149 MG/DL (ref 74–99)
HCT VFR BLD AUTO: 31.7 % (ref 41–52)
HGB BLD-MCNC: 9.9 G/DL (ref 13.5–17.5)
IMM GRANULOCYTES # BLD AUTO: 0.05 X10*3/UL (ref 0–0.5)
IMM GRANULOCYTES NFR BLD AUTO: 0.5 % (ref 0–0.9)
LYMPHOCYTES # BLD AUTO: 0.82 X10*3/UL (ref 0.8–3)
LYMPHOCYTES NFR BLD AUTO: 7.5 %
MAGNESIUM SERPL-MCNC: 1.33 MG/DL (ref 1.6–2.4)
MCH RBC QN AUTO: 25.7 PG (ref 26–34)
MCHC RBC AUTO-ENTMCNC: 31.2 G/DL (ref 32–36)
MCV RBC AUTO: 82 FL (ref 80–100)
MONOCYTES # BLD AUTO: 0.76 X10*3/UL (ref 0.05–0.8)
MONOCYTES NFR BLD AUTO: 7 %
NEUTROPHILS # BLD AUTO: 9.2 X10*3/UL (ref 1.6–5.5)
NEUTROPHILS NFR BLD AUTO: 84.1 %
NRBC BLD-RTO: 0 /100 WBCS (ref 0–0)
P AXIS: -21 DEGREES
P OFFSET: 209 MS
P ONSET: 159 MS
PLATELET # BLD AUTO: 167 X10*3/UL (ref 150–450)
POTASSIUM SERPL-SCNC: 4.4 MMOL/L (ref 3.5–5.3)
PR INTERVAL: 130 MS
PROT SERPL-MCNC: 7.3 G/DL (ref 6.4–8.2)
Q ONSET: 224 MS
QRS COUNT: 14 BEATS
QRS DURATION: 68 MS
QT INTERVAL: 372 MS
QTC CALCULATION(BAZETT): 439 MS
QTC FREDERICIA: 416 MS
R AXIS: 24 DEGREES
RBC # BLD AUTO: 3.85 X10*6/UL (ref 4.5–5.9)
SODIUM SERPL-SCNC: 138 MMOL/L (ref 136–145)
T AXIS: 50 DEGREES
T OFFSET: 410 MS
VENTRICULAR RATE: 84 BPM
WBC # BLD AUTO: 10.9 X10*3/UL (ref 4.4–11.3)

## 2024-12-19 PROCEDURE — 73700 CT LOWER EXTREMITY W/O DYE: CPT | Mod: LT

## 2024-12-19 PROCEDURE — 80053 COMPREHEN METABOLIC PANEL: CPT | Performed by: NURSE PRACTITIONER

## 2024-12-19 PROCEDURE — 83735 ASSAY OF MAGNESIUM: CPT | Performed by: NURSE PRACTITIONER

## 2024-12-19 PROCEDURE — 73700 CT LOWER EXTREMITY W/O DYE: CPT | Mod: LEFT SIDE | Performed by: RADIOLOGY

## 2024-12-19 PROCEDURE — 86140 C-REACTIVE PROTEIN: CPT | Performed by: NURSE PRACTITIONER

## 2024-12-19 PROCEDURE — 73610 X-RAY EXAM OF ANKLE: CPT | Mod: LT

## 2024-12-19 PROCEDURE — 83880 ASSAY OF NATRIURETIC PEPTIDE: CPT | Performed by: NURSE PRACTITIONER

## 2024-12-19 PROCEDURE — 84484 ASSAY OF TROPONIN QUANT: CPT | Performed by: NURSE PRACTITIONER

## 2024-12-19 PROCEDURE — 36415 COLL VENOUS BLD VENIPUNCTURE: CPT | Performed by: NURSE PRACTITIONER

## 2024-12-19 PROCEDURE — 93005 ELECTROCARDIOGRAM TRACING: CPT

## 2024-12-19 PROCEDURE — 99285 EMERGENCY DEPT VISIT HI MDM: CPT

## 2024-12-19 PROCEDURE — 85025 COMPLETE CBC W/AUTO DIFF WBC: CPT | Performed by: NURSE PRACTITIONER

## 2024-12-19 PROCEDURE — 73610 X-RAY EXAM OF ANKLE: CPT | Mod: LEFT SIDE | Performed by: RADIOLOGY

## 2024-12-19 ASSESSMENT — COLUMBIA-SUICIDE SEVERITY RATING SCALE - C-SSRS
6. HAVE YOU EVER DONE ANYTHING, STARTED TO DO ANYTHING, OR PREPARED TO DO ANYTHING TO END YOUR LIFE?: NO
1. IN THE PAST MONTH, HAVE YOU WISHED YOU WERE DEAD OR WISHED YOU COULD GO TO SLEEP AND NOT WAKE UP?: NO
2. HAVE YOU ACTUALLY HAD ANY THOUGHTS OF KILLING YOURSELF?: NO

## 2024-12-19 ASSESSMENT — LIFESTYLE VARIABLES
HAVE PEOPLE ANNOYED YOU BY CRITICIZING YOUR DRINKING: NO
EVER FELT BAD OR GUILTY ABOUT YOUR DRINKING: NO
TOTAL SCORE: 0
EVER HAD A DRINK FIRST THING IN THE MORNING TO STEADY YOUR NERVES TO GET RID OF A HANGOVER: NO
HAVE YOU EVER FELT YOU SHOULD CUT DOWN ON YOUR DRINKING: NO

## 2024-12-19 ASSESSMENT — PAIN DESCRIPTION - DESCRIPTORS
DESCRIPTORS: ACHING;THROBBING;SORE
DESCRIPTORS: THROBBING;SORE

## 2024-12-19 ASSESSMENT — PAIN SCALES - GENERAL
PAINLEVEL_OUTOF10: 8
PAINLEVEL_OUTOF10: 10 - WORST POSSIBLE PAIN
PAINLEVEL_OUTOF10: 6

## 2024-12-19 ASSESSMENT — PAIN - FUNCTIONAL ASSESSMENT
PAIN_FUNCTIONAL_ASSESSMENT: 0-10
PAIN_FUNCTIONAL_ASSESSMENT: 0-10

## 2024-12-19 ASSESSMENT — PAIN DESCRIPTION - PAIN TYPE
TYPE: ACUTE PAIN
TYPE: ACUTE PAIN

## 2024-12-19 ASSESSMENT — PAIN DESCRIPTION - LOCATION
LOCATION: FOOT
LOCATION: FOOT

## 2024-12-19 ASSESSMENT — PAIN DESCRIPTION - ORIENTATION
ORIENTATION: LEFT
ORIENTATION: LEFT

## 2024-12-19 ASSESSMENT — PAIN DESCRIPTION - FREQUENCY
FREQUENCY: CONSTANT/CONTINUOUS
FREQUENCY: CONSTANT/CONTINUOUS

## 2024-12-19 NOTE — ED PROVIDER NOTES
"HPI   Chief Complaint   Patient presents with    Leg Swelling     Pt co bilateral leg swelling and increased left foot pain x 1 day. Pt states, \" it hurts too bad to put weight on my left foot\".        79-year-old male presents emergency department, states chronic leg swelling   Right Lower Venous Insufficiency: No evidence of superficial venous reflux. Left Lower Venous Insufficiency: Reflux is noted in the saphenofemoral junction vein. Multiple lymph nodes noted in bilateral groins.   Previously discontinued Amlodipine and pioglitazone with no improvement.   Started on Furosemide c/b DALIA w/o improvement.   TTE w/LVEF 50-55% and no LV diastolic dysfunction.     She has got up last night to use the bathroom, pain in his posterior left ankle, states his left leg is his good leg, right leg seems to be his worst leg.  Describes pain to the back of his ankle, with ambulation, with flexion extension of the ankle, over the Achilles.    Patient states his legs are improving, swelling is improving.  He is being referred to lymph PT      History provided by:  Patient   used: No            Patient History   Past Medical History:   Diagnosis Date    Bladder calculi 10/12/2023    Cancer (Multi)     Diabetes mellitus (Multi)     Hyperlipidemia     Hypertension     Impacted cerumen, right ear 09/05/2019    Impacted cerumen of right ear    Other specified disorders of kidney and ureter 02/11/2022    Renal mass    Personal history of nicotine dependence 09/20/2021    History of nicotine dependence    Personal history of other diseases of urinary system 02/11/2022    History of hematuria    Personal history of urinary (tract) infections 12/20/2021    History of hemorrhagic cystitis    Unspecified symptoms and signs involving the genitourinary system     UTI symptoms     Past Surgical History:   Procedure Laterality Date    OTHER SURGICAL HISTORY  04/07/2022    Transurethral resection of bladder tumor     Family " History   Problem Relation Name Age of Onset    Diabetes Mother      Hypertension Maternal Grandfather       Social History     Tobacco Use    Smoking status: Former     Types: Cigars     Quit date: 3/1/2022     Years since quittin.8    Smokeless tobacco: Never   Vaping Use    Vaping status: Never Used   Substance Use Topics    Alcohol use: Not Currently    Drug use: Never       Physical Exam   ED Triage Vitals [24 0754]   Temp Heart Rate Respirations BP   -- 84 20 166/76      Pulse Ox Temp src Heart Rate Source Patient Position   99 % -- Monitor Lying      BP Location FiO2 (%)     Right arm --       Physical Exam  Constitutional: Vitals noted, no distress. Afebrile.   Cardiovascular: Regular, rate, rhythm, no murmur.   Pulmonary: Lungs clear bilaterally with good aeration. No adventitious breath sounds.   Gastrointestinal: Soft, nonsurgical. Nontender. No peritoneal signs. Normoactive bowel sounds.   Musculoskeletal: Erythema noted bilateral lower extremities, swelling but appears decompressed, chronic venous stasis dermatitis.  Tender moderately over the Achilles on the left, nontender medial lateral ankle.  Skin: No rash.   Neuro: No focal neurologic deficits, NIH score of 0.      ED Course & MDM   Diagnoses as of 24 1225   Acute left ankle pain     Labs Reviewed   CBC WITH AUTO DIFFERENTIAL - Abnormal       Result Value    WBC 10.9      nRBC 0.0      RBC 3.85 (*)     Hemoglobin 9.9 (*)     Hematocrit 31.7 (*)     MCV 82      MCH 25.7 (*)     MCHC 31.2 (*)     RDW 15.9 (*)     Platelets 167      Neutrophils % 84.1      Immature Granulocytes %, Automated 0.5      Lymphocytes % 7.5      Monocytes % 7.0      Eosinophils % 0.8      Basophils % 0.1      Neutrophils Absolute 9.20 (*)     Immature Granulocytes Absolute, Automated 0.05      Lymphocytes Absolute 0.82      Monocytes Absolute 0.76      Eosinophils Absolute 0.09      Basophils Absolute 0.01     MAGNESIUM - Abnormal    Magnesium 1.33 (*)     COMPREHENSIVE METABOLIC PANEL - Abnormal    Glucose 149 (*)     Sodium 138      Potassium 4.4      Chloride 103      Bicarbonate 28      Anion Gap 11      Urea Nitrogen 15      Creatinine 1.01      eGFR 76      Calcium 9.0      Albumin 3.7      Alkaline Phosphatase 56      Total Protein 7.3      AST 12      Bilirubin, Total 1.0      ALT 7 (*)    B-TYPE NATRIURETIC PEPTIDE - Abnormal     (*)     Narrative:        <100 pg/mL - Heart failure unlikely  100-299 pg/mL - Intermediate probability of acute heart                  failure exacerbation. Correlate with clinical                  context and patient history.    >=300 pg/mL - Heart Failure likely. Correlate with clinical                  context and patient history.    BNP testing is performed using different testing methodology at [facility] than at other [facility]. Direct result comparisons should only be made within the same method.      C-REACTIVE PROTEIN - Abnormal    C-Reactive Protein 1.21 (*)    TROPONIN I, HIGH SENSITIVITY - Normal    Troponin I, High Sensitivity 18      Narrative:     Less than 99th percentile of normal range cutoff-  Female and children under 18 years old <14 ng/L; Male <21 ng/L: Negative  Repeat testing should be performed if clinically indicated.     Female and children under 18 years old 14-50 ng/L; Male 21-50 ng/L:  Consistent with possible cardiac damage and possible increased clinical   risk. Serial measurements may help to assess extent of myocardial damage.     >50 ng/L: Consistent with cardiac damage, increased clinical risk and  myocardial infarction. Serial measurements may help assess extent of   myocardial damage.      NOTE: Children less than 1 year old may have higher baseline troponin   levels and results should be interpreted in conjunction with the overall   clinical context.     NOTE: Troponin I testing is performed using a different   testing methodology at [facility] than  at other   system hospitals. Direct result comparisons should only   be made within the same method.        CT ankle left wo IV contrast   Final Result   *No fracture.   *Generalized soft tissue swelling and skin thickening. Findings   could relate to cellulitis.   *No organized fluid collection. Lack of intravenous contrast   administration precludes ability to adequately evaluate for an   abscess.   Signed by Karsten Almanza MD      XR ankle left 3+ views   Final Result   1. There is questionable cortical step-off involving the distal fibula   seen on the oblique projection only. CT imaging of the ankle may prove   additionally useful.   2. Diffuse soft tissue swelling of the left leg.   3. 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   4. Plantar calcaneal spur measuring 0.6 cm.   Signed by Bebo Briseno MD                    No data recorded     [place] Coma Scale Score: 15 (12/19/24 0756 : Doris Ross RN)                   Medical Decision Making  Patient presents, complaining of pain in the left ankle.  States recently started on some diuretics, been wrapping his legs, is due to follow-up for lymph PT but states overall the legs are improving, swelling is much improved.    Workup initiated, given his recent history CBC and metabolic panels obtained, CRP, BNP and troponin, all relatively unremarkable.    EKG at 829 with ventricular rate of 84, interpreted me, shows sinus rhythm PACs, normal axis and interval, remarkable ST and T wave patterns, no evidence of acute ischemia other acute findings per    X-ray imaging of the left ankle is concerning for questionable abnormality so recommended CT scan CT shows generalized soft tissue swelling and skin thickening, otherwise unremarkable.    Discussed results with the patient, discussed due to his decreased ambulation status recommended hospitalization for him but he did not feel this was necessary and prefer to be discharged home.  States his  wife can help care for him at home.  I did recommend a walker, states that he thinks there is 1 at home otherwise his wife can get 1.  I did offer him an ambulance ride home as well, though he thinks he will be able to get himself into the car with a little bit of assistance and get out with the help of a neighbor at home.    Ultimately recommended continued workup for this leg swelling with primary care and outpatient team, did recommend follow-up with Ortho as well.    Procedure  Procedures     Yomaira Barnes, LESIA-CNP  12/19/24 [id number]

## 2024-12-19 NOTE — TELEPHONE ENCOUNTER
Pt's wife called to inform Jessica Oakes that the pt went to the hospital yesterday (12/18/24). She stated that her  could barely stand or walk yesterday so they decided to take him to the ED.

## 2025-01-02 ASSESSMENT — ENCOUNTER SYMPTOMS
NUMBNESS: 0
COLOR CHANGE: 1
PARESTHESIAS: 0
POOR WOUND HEALING: 0

## 2025-02-20 DIAGNOSIS — I10 ESSENTIAL HYPERTENSION: ICD-10-CM

## 2025-02-20 RX ORDER — LOSARTAN POTASSIUM 100 MG/1
100 TABLET ORAL DAILY
Qty: 90 TABLET | Refills: 4 | Status: SHIPPED | OUTPATIENT
Start: 2025-02-20

## 2025-03-18 ENCOUNTER — OFFICE VISIT (OUTPATIENT)
Dept: VASCULAR SURGERY | Facility: CLINIC | Age: 80
End: 2025-03-18
Payer: MEDICARE

## 2025-03-18 VITALS
OXYGEN SATURATION: 97 % | WEIGHT: 180 LBS | DIASTOLIC BLOOD PRESSURE: 60 MMHG | HEART RATE: 81 BPM | HEIGHT: 64 IN | SYSTOLIC BLOOD PRESSURE: 128 MMHG | BODY MASS INDEX: 30.73 KG/M2

## 2025-03-18 DIAGNOSIS — I87.2 CHRONIC VENOUS INSUFFICIENCY: Primary | ICD-10-CM

## 2025-03-18 DIAGNOSIS — I89.0 LYMPHEDEMA: ICD-10-CM

## 2025-03-18 PROCEDURE — 99214 OFFICE O/P EST MOD 30 MIN: CPT | Performed by: NURSE PRACTITIONER

## 2025-03-18 PROCEDURE — 1159F MED LIST DOCD IN RCRD: CPT | Performed by: NURSE PRACTITIONER

## 2025-03-18 PROCEDURE — 3078F DIAST BP <80 MM HG: CPT | Performed by: NURSE PRACTITIONER

## 2025-03-18 PROCEDURE — 3074F SYST BP LT 130 MM HG: CPT | Performed by: NURSE PRACTITIONER

## 2025-03-18 NOTE — PROGRESS NOTES
"Subjective   Mario Alberto Segal is a 79 y.o. male.    Chief Complaint:  80yo patient here for 3 month follow up for lymphedema.     HPI  Most recent visit 12/18/24, referred to lymphedema clinic. Patient states he never received communication to start PT for lymphedema.     Interval Hx: Presented to ED for acute RLE weakness. Had a fall in February on ice.     PMHx; HTN, HLD, Obesity, Lymphedema, CKD, Bladder CA  PSHx: Transurethral Resection of Bladder Tumor  Social Hx: Former smoker, quit 2022.      Patient presents unaccompanied today. Denies BLE pain. Endorses intermittent pruritus. Denies BLE paresthesias. Not following with podiatry or wound care. Wrapping BLE w/Ace bandages for compression and applying OTC aquaphor. Denies discoloration and wounds. Right testicle is enlarged. Medication adherent. Has had intentional 20lb weight loss. Decreased appetite and oral intake.     Review of Systems   Constitutional: Positive for weight loss.   Cardiovascular:  Positive for leg swelling. Negative for claudication.   Skin:  Positive for itching. Negative for color change and poor wound healing.   Musculoskeletal:  Negative for back pain.   Gastrointestinal:  Negative for abdominal pain.   Neurological:  Negative for numbness and paresthesias.     Objective   Visit Vitals  /60 (BP Location: Right arm, Patient Position: Sitting)   Pulse 81   Ht 1.626 m (5' 4\")   Wt 81.6 kg (180 lb)   SpO2 97%   BMI 30.90 kg/m²   Smoking Status Former   BSA 1.92 m²     Vitals reviewed.   Constitutional:       Appearance: Healthy appearance.   Neck:      Vascular: No carotid bruit.   Pulmonary:      Effort: Pulmonary effort is normal.      Breath sounds: Normal breath sounds.   Cardiovascular:      Normal rate. Regular rhythm.      Murmurs: There is no murmur.      No gallop.  No click. No rub.   Edema:     Peripheral edema present.     Ankle: bilateral 1+ edema of the ankle.     Feet: bilateral 1+ edema of the feet.  Skin:     " "General: Skin is warm and dry.   Feet:      Right foot:      Skin integrity: No ulcer, blister, skin breakdown or erythema.      Left foot:      Skin integrity: No ulcer, blister, skin breakdown or erythema.   Neurological:      Mental Status: Alert and oriented to person, place and time.       Lab Review:   Lab Results   Component Value Date     12/19/2024    K 4.4 12/19/2024     12/19/2024    CO2 28 12/19/2024    BUN 15 12/19/2024    CREATININE 1.01 12/19/2024    GLUCOSE 149 (H) 12/19/2024    CALCIUM 9.0 12/19/2024     Lab Results   Component Value Date    WBC 10.9 12/19/2024    HGB 9.9 (L) 12/19/2024    HCT 31.7 (L) 12/19/2024    MCV 82 12/19/2024     12/19/2024     No results found for: \"CHOL\", \"TRIG\", \"HDL\", \"LDLDIRECT\"    Diagnostic Imaging:   Vascular US LE Venous Insufficiency Bilateral 10/14/24  CONCLUSIONS:  Right Lower Venous Insufficiency: No evidence of superficial venous reflux.  Left Lower Venous Insufficiency: Reflux is noted in the saphenofemoral junction vein.  Right Lower Venous: Additional Findings; Lymph nodes Multiple lymph nodes noted in right groin. Largest measured .96 cm by 1.42 cm.  Left Lower Venous: Additional Findings; Lymph nodes Multiple lymph nodes noted in left groin. Largest measured .71 cm by 1.44 cm.     Imaging & Doppler Findings:     Right          Compress Thrombus   Time  SFJ              Yes      None  Prox Thigh GSV   Yes      None  Mid Thigh GSV    Yes      None  Knee GSV         Yes      None  Prox Calf GSV    Yes      None  Mid Calf GSV     Yes      None  Dist Calf GSV    Yes      None  SPJ              Yes      None  SSV Prox         Yes      None  SSV Mid          Yes      None  SSV Distal       Yes      None  Common FV                        2.70 sec        Left           Compress Thrombus  Diam   Depth    Time  SFJ              Yes      None   7.7 mm 23.5 mm 2.10 sec  Prox Thigh GSV   Yes      None  Mid Thigh GSV    Yes      None  Knee GSV         " Yes      None  Prox Calf GSV    Yes      None  Mid Calf GSV     Yes      None  Dist Calf GSV    Yes      None  SPJ              Yes      None  SSV Prox         Yes      None  SSV Mid          Yes      None  SSV Distal       Yes      None        Right                 Compressible Thrombus        Flow  Distal External Iliac                None  CFV                       Yes        None       Pulsatile  PFV                       Yes        None  FV Proximal               Yes        None  FV Mid                    Yes        None   Spontaneous/Phasic  FV Distal                 Yes        None  Popliteal                 Yes        None   Spontaneous/Phasic  Peroneal                  Yes        None  PTV                       Yes        None        Left                  Compress Thrombus        Flow  Distal External Iliac            None  CFV                     Yes      None   Spontaneous/Phasic  PFV                     Yes      None  FV Proximal             Yes      None  FV Mid                  Yes      None   Spontaneous/Phasic  FV Distal               Yes      None  Popliteal               Yes      None   Spontaneous/Phasic  Peroneal                Yes      None  PTV                     Yes      None        [id number] Diomedes Willett MD  Electronically signed by [id number] Diomedes Willett MD on 10/15/2024 at 12:49:1PM      Current Outpatient Medications:     ASPIRIN ORAL, Take 1 tablet by mouth in the morning. Aspirin 81 MG TABS (Patient taking differently: Take 1 tablet (81 mg) by mouth once daily.), Disp: , Rfl:     carvedilol (Coreg) 6.25 mg tablet, Take 1 tablet (6.25 mg) by mouth 2 times a day., Disp: 180 tablet, Rfl: 3    cholecalciferol (Vitamin D-3) 50 MCG (2000 UT) tablet, Take 1 tablet (2,000 Units) by mouth once daily., Disp: , Rfl:     cyanocobalamin, vitamin B-12, (VITAMIN B-12 ORAL), Take 1 tablet by mouth once daily., Disp: , Rfl:     emollient combination no.97 (Aquaphilic) ointment, Apply topically. Use as  "directed - As needed., Disp: , Rfl:     ezetimibe-simvastatin (Vytorin) 10-40 mg tablet, TAKE 1 TABLET BY MOUTH EVERY DAY, Disp: 90 tablet, Rfl: 1    ferrous sulfate 325 (65 Fe) MG tablet, Take 1 tablet (325 mg) by mouth once daily., Disp: , Rfl:     folic acid/multivit-min/lutein (CENTRUM SILVER ORAL), , Disp: , Rfl:     furosemide (Lasix) 20 mg tablet, TAKE 1 TABLET BY MOUTH EVERY DAY, Disp: 90 tablet, Rfl: 1    hydrALAZINE (Apresoline) 100 mg tablet, Take one tablet three times a day, Disp: 90 tablet, Rfl: 11    losartan (Cozaar) 100 mg tablet, TAKE 1 TABLET BY MOUTH EVERY DAY, Disp: 90 tablet, Rfl: 4    metFORMIN (Glucophage) 1,000 mg tablet, TAKE 1 TABLET BY MOUTH EVERY 12 HOURS, Disp: 180 tablet, Rfl: 3    nystatin (Mycostatin) 100,000 unit/gram powder, Apply topically.  APPLY 2-3 TIMES DAILY TO AFFECTED AREA(S)., Disp: , Rfl:     OneTouch Ultra Test strip, 1 strip daily, Disp: , Rfl:     pen needle, diabetic (BD Geraldine 2nd Gen Pen Needle) 32 gauge x 5/32\" needle, USE 1 DAILY, Disp: 200 each, Rfl: 3    semaglutide (OZEMPIC) 1 mg/dose (4 mg/3 mL) pen injector, Inject 1 mg under the skin 1 (one) time per week., Disp: 9 mL, Rfl: 3    Assessment/Plan   Chronic Venous Insufficiency/Lymphedema  Denies BLE pain. Endorses intermittent pruritus. Denies BLE paresthesias. Not following with podiatry or wound care. Wrapping BLE w/Ace bandages for compression and applying OTC aquaphor. Denies discoloration and wounds. Right testicle is enlarged. Medication adherent. Has had intentional 20lb weight loss. Decreased appetite and oral intake.   +1 pitting BLE edema. No visible wounds.   Vascular US LE Venous Insufficiency Bilateral 10/14/24 Right Lower Venous Insufficiency: No evidence of superficial venous reflux. Left Lower Venous Insufficiency: Reflux is noted in the saphenofemoral junction vein. Multiple lymph nodes noted in bilateral groins.   Previously discontinued Amlodipine and pioglitazone with no improvement.   TTE " w/LVEF 50-55% and no LV diastolic dysfunction. Started on Furosemide w/o improvement. .  Consider SGLT2i and/or MRA.   BMI 34-->30 while on GLP-1 x 6 months.   Encourage compression, elevation, and walking as tolerated.  Referred to Lymphedema PT-provided with pamphlet including telephone number to call.

## 2025-03-26 ASSESSMENT — ENCOUNTER SYMPTOMS
WEIGHT LOSS: 1
POOR WOUND HEALING: 0
COLOR CHANGE: 0
NUMBNESS: 0
PARESTHESIAS: 0
ABDOMINAL PAIN: 0
BACK PAIN: 0
CLAUDICATION: 0

## 2025-03-31 ENCOUNTER — TELEPHONE (OUTPATIENT)
Dept: PRIMARY CARE | Facility: CLINIC | Age: 80
End: 2025-03-31
Payer: MEDICARE

## 2025-04-11 ENCOUNTER — APPOINTMENT (OUTPATIENT)
Dept: PRIMARY CARE | Facility: CLINIC | Age: 80
End: 2025-04-11
Payer: MEDICARE

## 2025-04-11 VITALS
DIASTOLIC BLOOD PRESSURE: 72 MMHG | OXYGEN SATURATION: 96 % | SYSTOLIC BLOOD PRESSURE: 154 MMHG | HEART RATE: 74 BPM | RESPIRATION RATE: 16 BRPM | WEIGHT: 179 LBS | TEMPERATURE: 98.8 F | BODY MASS INDEX: 30.73 KG/M2

## 2025-04-11 DIAGNOSIS — N18.31 TYPE 2 DIABETES MELLITUS WITH STAGE 3A CHRONIC KIDNEY DISEASE, WITHOUT LONG-TERM CURRENT USE OF INSULIN (MULTI): Primary | ICD-10-CM

## 2025-04-11 DIAGNOSIS — E11.22 TYPE 2 DIABETES MELLITUS WITH STAGE 3A CHRONIC KIDNEY DISEASE, WITHOUT LONG-TERM CURRENT USE OF INSULIN (MULTI): Primary | ICD-10-CM

## 2025-04-11 LAB — POC HEMOGLOBIN A1C: 6 % (ref 4.2–6.5)

## 2025-04-11 PROCEDURE — 83036 HEMOGLOBIN GLYCOSYLATED A1C: CPT | Performed by: STUDENT IN AN ORGANIZED HEALTH CARE EDUCATION/TRAINING PROGRAM

## 2025-04-11 PROCEDURE — 3077F SYST BP >= 140 MM HG: CPT | Performed by: STUDENT IN AN ORGANIZED HEALTH CARE EDUCATION/TRAINING PROGRAM

## 2025-04-11 PROCEDURE — 1159F MED LIST DOCD IN RCRD: CPT | Performed by: STUDENT IN AN ORGANIZED HEALTH CARE EDUCATION/TRAINING PROGRAM

## 2025-04-11 PROCEDURE — 3078F DIAST BP <80 MM HG: CPT | Performed by: STUDENT IN AN ORGANIZED HEALTH CARE EDUCATION/TRAINING PROGRAM

## 2025-04-11 PROCEDURE — G2211 COMPLEX E/M VISIT ADD ON: HCPCS | Performed by: STUDENT IN AN ORGANIZED HEALTH CARE EDUCATION/TRAINING PROGRAM

## 2025-04-11 PROCEDURE — 99214 OFFICE O/P EST MOD 30 MIN: CPT | Performed by: STUDENT IN AN ORGANIZED HEALTH CARE EDUCATION/TRAINING PROGRAM

## 2025-04-11 PROCEDURE — 1160F RVW MEDS BY RX/DR IN RCRD: CPT | Performed by: STUDENT IN AN ORGANIZED HEALTH CARE EDUCATION/TRAINING PROGRAM

## 2025-04-11 PROCEDURE — 1036F TOBACCO NON-USER: CPT | Performed by: STUDENT IN AN ORGANIZED HEALTH CARE EDUCATION/TRAINING PROGRAM

## 2025-04-11 RX ORDER — BACLOFEN 20 MG
TABLET ORAL
COMMUNITY

## 2025-04-11 ASSESSMENT — ENCOUNTER SYMPTOMS
VOMITING: 0
LIGHT-HEADEDNESS: 0
NAUSEA: 0
FEVER: 0
DIZZINESS: 0
SHORTNESS OF BREATH: 0

## 2025-04-11 NOTE — PROGRESS NOTES
Subjective   Mario Alberto Segal is a 79 y.o. male who presents for Follow-up.  Patient seen today for follow-up of lower extremity swelling.  Patient has of chronic lymphedema but had progressive worsening, redness associated.    No pain, no difficulty with ambulation.  Patient is not had any sedentary experiences.    Overall improving since starting Lasix, is currently on them most days, does not take them if he is going out or if there is no significant abnormalities with this.    Reviewed BMP, renal function near baseline.    Patient is taking diuretics less, has seen vascular recently for chronic lymphedema that is going to start lymphedema physical therapy.  Has lost approximately 50 pounds in the past 1 year of combination of Ozempic as well as improving chronic lymphedema.    Is ambulating well, did say he did have 1 fall on ice during the winter but improved well did not seek medical attention for it at that time.    Edema    Pertinent negative symptoms include no chest pain, no fever, no nausea and no vomiting.       Review of Systems   Constitutional:  Negative for fever.   Respiratory:  Negative for shortness of breath.    Cardiovascular:  Negative for chest pain.   Gastrointestinal:  Negative for nausea and vomiting.   Neurological:  Negative for dizziness and light-headedness.   All other systems reviewed and are negative.      Objective   Physical Exam  Vitals reviewed.   Constitutional:       General: He is not in acute distress.     Appearance: Normal appearance. He is not toxic-appearing.   HENT:      Head: Normocephalic and atraumatic.      Nose: Nose normal.   Eyes:      Extraocular Movements: Extraocular movements intact.   Cardiovascular:      Rate and Rhythm: Normal rate and regular rhythm.      Heart sounds: No murmur heard.     No friction rub. No gallop.   Pulmonary:      Effort: Pulmonary effort is normal. No respiratory distress.      Breath sounds: Normal breath sounds. No wheezing,  rhonchi or rales.   Musculoskeletal:      Comments: Chronic lymphedema both lower extremities, pink color nontender to palpation.    Improving scabbing of lower extremities, persistent 1+ pitting edema   Skin:     General: Skin is warm and dry.   Neurological:      General: No focal deficit present.      Mental Status: He is alert.   Psychiatric:         Mood and Affect: Mood normal.         Behavior: Behavior normal.       Assessment/Plan   Problem List Items Addressed This Visit       Type 2 diabetes mellitus with stage 3a chronic kidney disease, without long-term current use of insulin (Multi) - Primary    Relevant Orders    POCT glycosylated hemoglobin (Hb A1C) manually resulted     Lab Results   Component Value Date    HGBA1C 6.0 04/11/2025     Continue treatment including: Metformin and GLP-1/terzepatide  Complications include: Nephropathy  Continue to monitor blood sugars  Continue with follow ups including:   Ophthalmology: Up-to-date  Podiatry: Not Indicated  Cardiology: Up-to-date  Discussed healthy, low carb diet and trying to get 150 minutes of physical activity per week.  Follow up in 3 months or sooner if new concerns arise    Continue follow-up with lymphedema clinic    Blood sugars improving congratulated patient on weight loss.    Continue stay physically active make sure to get enough protein in diet.  Follow-up as new concerns arise.      Patient seen today for follow-up lower extremity swelling.  Did discuss wound care or lymphedema physical therapy referral.  Patient deferred at this time.    Continue Lasix only as needed.  Continually monitor renal function every 3 months.    Follow-up in 3 months for repeat evaluation.    Discussed diabetic medications, when he is due for his next refill we will increase his Victoza and discontinue pioglitazone.  Currently in the well-controlled range and want to limit risk factors for lower extremity swelling and cardiomyopathy.    Follow-up sooner if any new  concerns arise.

## 2025-05-12 DIAGNOSIS — I11.9 HYPERTENSIVE HEART DISEASE, UNSPECIFIED WHETHER HEART FAILURE PRESENT: ICD-10-CM

## 2025-05-12 RX ORDER — EZETIMIBE AND SIMVASTATIN 10; 40 MG/1; MG/1
1 TABLET ORAL DAILY
Qty: 90 TABLET | Refills: 1 | Status: SHIPPED | OUTPATIENT
Start: 2025-05-12

## 2025-05-16 DIAGNOSIS — E11.9 TYPE 2 DIABETES MELLITUS WITHOUT COMPLICATIONS: ICD-10-CM

## 2025-05-16 RX ORDER — METFORMIN HYDROCHLORIDE 1000 MG/1
1000 TABLET ORAL EVERY 12 HOURS
Qty: 180 TABLET | Refills: 0 | Status: SHIPPED | OUTPATIENT
Start: 2025-05-16

## 2025-05-28 DIAGNOSIS — E11.9 TYPE 2 DIABETES MELLITUS WITHOUT COMPLICATIONS: ICD-10-CM

## 2025-05-29 RX ORDER — METFORMIN HYDROCHLORIDE 1000 MG/1
1000 TABLET ORAL EVERY 12 HOURS
Qty: 180 TABLET | Refills: 0 | OUTPATIENT
Start: 2025-05-29

## 2025-06-27 ENCOUNTER — APPOINTMENT (OUTPATIENT)
Dept: VASCULAR SURGERY | Facility: CLINIC | Age: 80
End: 2025-06-27
Payer: MEDICARE

## 2025-08-04 DIAGNOSIS — E11.9 TYPE 2 DIABETES MELLITUS WITHOUT COMPLICATIONS: ICD-10-CM

## 2025-08-04 RX ORDER — METFORMIN HYDROCHLORIDE 1000 MG/1
1000 TABLET ORAL EVERY 12 HOURS
Qty: 180 TABLET | Refills: 0 | Status: SHIPPED | OUTPATIENT
Start: 2025-08-04

## 2025-08-05 DIAGNOSIS — I10 ESSENTIAL (PRIMARY) HYPERTENSION: ICD-10-CM

## 2025-08-05 RX ORDER — CARVEDILOL 6.25 MG/1
6.25 TABLET ORAL 2 TIMES DAILY
Qty: 180 TABLET | Refills: 3 | Status: SHIPPED | OUTPATIENT
Start: 2025-08-05

## 2025-10-16 ENCOUNTER — APPOINTMENT (OUTPATIENT)
Dept: PRIMARY CARE | Facility: CLINIC | Age: 80
End: 2025-10-16
Payer: MEDICARE

## 2025-10-27 ENCOUNTER — APPOINTMENT (OUTPATIENT)
Dept: CARDIOLOGY | Facility: CLINIC | Age: 80
End: 2025-10-27
Payer: MEDICARE

## 2025-10-31 ENCOUNTER — APPOINTMENT (OUTPATIENT)
Dept: PRIMARY CARE | Facility: CLINIC | Age: 80
End: 2025-10-31
Payer: MEDICARE